# Patient Record
Sex: FEMALE | Race: WHITE | NOT HISPANIC OR LATINO
[De-identification: names, ages, dates, MRNs, and addresses within clinical notes are randomized per-mention and may not be internally consistent; named-entity substitution may affect disease eponyms.]

---

## 2021-01-07 ENCOUNTER — APPOINTMENT (OUTPATIENT)
Dept: ORTHOPEDIC SURGERY | Facility: CLINIC | Age: 61
End: 2021-01-07
Payer: OTHER MISCELLANEOUS

## 2021-01-07 PROBLEM — Z00.00 ENCOUNTER FOR PREVENTIVE HEALTH EXAMINATION: Status: ACTIVE | Noted: 2021-01-07

## 2021-01-07 PROCEDURE — 73562 X-RAY EXAM OF KNEE 3: CPT | Mod: RT

## 2021-01-07 PROCEDURE — 72100 X-RAY EXAM L-S SPINE 2/3 VWS: CPT

## 2021-01-07 PROCEDURE — 99072 ADDL SUPL MATRL&STAF TM PHE: CPT

## 2021-01-07 PROCEDURE — 99204 OFFICE O/P NEW MOD 45 MIN: CPT

## 2021-01-07 PROCEDURE — 73521 X-RAY EXAM HIPS BI 2 VIEWS: CPT

## 2021-01-07 NOTE — PHYSICAL EXAM
[de-identified] : Patient has full passive internal/external rotation of the right hip with no restrictions or pain. She does have some tenderness to palpation of the right-sided lower lumbar segments and the paraspinal musculature. Negative straight leg raising test she is neurovascular intact in right lower extremity with no significant atrophy.\par \par Specific to the knee the exam today shows there is definite warmth about the knee as compared to contralateral side there is definite medial joint line tenderness positive Maddy sign and no obvious effusion noted. Range of motion is 3-90°. The knee is stable to varus valgus stress and AP stress both at 90° of flexion and full extension. [de-identified] : Radiographs AP standing individual lateral and sunrise views are pink and well preserved joint spaces of the tibiofemoral articulation minimal narrowing of the lateral patella facet on the sunrise view of the right knee. AP and lateral of the lumbar spine were obtained showing mild degenerative changes of the facet joints intervertebral flex fairly well maintained no evidence of fracture. AP and lateral of both hips were obtained showing mild degenerative changes of the right hip.

## 2021-01-07 NOTE — HISTORY OF PRESENT ILLNESS
[de-identified] : First visit for this patient she is here because of a complaint of mostly right knee pain however she is also having pain that radiates from her lower back and right hip and down the lateral aspect of her leg. She states however she is limping most because of discomfort in the right knee. She injured her self in November at work where she stepped on a piece of last with a nail in it. She has been limping ever since. Is not improving.

## 2021-01-07 NOTE — DISCUSSION/SUMMARY
[de-identified] : Patient's clinical exam and history are consistent with a probable medial meniscal tear. She also had some overlapping symptoms are consistent with right-sided lumbar radiculopathy. Because of the degree of discomfort and as well as the duration of the discomfort of the right knee in addition to the fact that there are no root after findings it is prudent at this point to get an MRI to evaluate the size and location of a probable medial meniscal tear. It also placed on a Medrol Dosepak to help reduce her sciatic-type symptoms and also reduce some of her knee pain. We'll place her in a Genutrain knee brace for support and sent her for an MRI. She'll return with the MRI is available for review.

## 2021-01-07 NOTE — REASON FOR VISIT
[Initial Visit] : an initial visit for [FreeTextEntry2] : PATIENT STEPPED ON SOMETHING AT WORK AND THREW OFF BALANCE ON NOVEMBER 27, 2020- C/O LOW BACK-RIGHT HIP-RIGHT KNEE PAIN RADIATING DOWN INTO THE RIGHT FOOT. SENT FOR XRAYS OF LUMBAR, HIP AND KNEES

## 2021-01-10 ENCOUNTER — FORM ENCOUNTER (OUTPATIENT)
Age: 61
End: 2021-01-10

## 2021-01-12 ENCOUNTER — FORM ENCOUNTER (OUTPATIENT)
Age: 61
End: 2021-01-12

## 2021-02-09 ENCOUNTER — APPOINTMENT (OUTPATIENT)
Dept: ORTHOPEDIC SURGERY | Facility: CLINIC | Age: 61
End: 2021-02-09
Payer: OTHER MISCELLANEOUS

## 2021-02-09 PROCEDURE — 99214 OFFICE O/P EST MOD 30 MIN: CPT | Mod: 25

## 2021-02-09 PROCEDURE — 99072 ADDL SUPL MATRL&STAF TM PHE: CPT

## 2021-02-09 PROCEDURE — 20611 DRAIN/INJ JOINT/BURSA W/US: CPT | Mod: RT

## 2021-02-09 NOTE — DISCUSSION/SUMMARY
[Surgical risks reviewed] : Surgical risks reviewed [de-identified] : Patient I discussed her options she will consider knee arthroscopic surgery debridement risks and benefits were discussed in detail. Patient asked appropriate questions as did her family member. His questions were asked to their satisfaction. Talked about potential complications of the proposed surgery such as infection and non-improvement of pain.\par \par Findings the patient is a high clinical suspicion of the meniscus being the significant course of her medial sided knee discomfort. She does have some underlying early degenerative changes and I indicated that she may continue to be somewhat symptomatic treatment is underlying arthritis. Patient understands and would like to proceed nonetheless

## 2021-02-09 NOTE — PHYSICAL EXAM
[de-identified] : \par \par Specific to the knee the exam today shows there is definite warmth about the knee as compared to contralateral side there is definite medial joint line tenderness positive Maddy sign and no obvious effusion noted. Range of motion is 3-90°. The knee is stable to varus valgus stress and AP stress both at 90° of flexion and full extension.

## 2021-02-09 NOTE — PROCEDURE
[de-identified] : Patient was given a cortisone injection to the lateral compartment right knee. This is done in order to buy some temporary relief until patient considers arthroscopic surgery. Patient tolerated procedure well.

## 2021-02-09 NOTE — HISTORY OF PRESENT ILLNESS
[de-identified] : Patient returns today she returns with the results of the right knee MRI findings which show some mild degenerative changes of the medial compartment but a large complex tear of the medial meniscus as well. Recall patient continues to complain of a sharp intermittent right medial knee pain. It has not improved with conservative measures such as her Medrol Dosepak.

## 2021-03-10 DIAGNOSIS — Z01.818 ENCOUNTER FOR OTHER PREPROCEDURAL EXAMINATION: ICD-10-CM

## 2021-03-11 ENCOUNTER — APPOINTMENT (OUTPATIENT)
Dept: DISASTER EMERGENCY | Facility: CLINIC | Age: 61
End: 2021-03-11

## 2021-03-12 LAB — SARS-COV-2 N GENE NPH QL NAA+PROBE: NOT DETECTED

## 2021-03-15 ENCOUNTER — APPOINTMENT (OUTPATIENT)
Dept: ORTHOPEDIC SURGERY | Facility: AMBULATORY SURGERY CENTER | Age: 61
End: 2021-03-15
Payer: OTHER MISCELLANEOUS

## 2021-03-15 PROCEDURE — 29881 ARTHRS KNE SRG MNISECTMY M/L: CPT | Mod: RT

## 2021-03-18 ENCOUNTER — APPOINTMENT (OUTPATIENT)
Dept: ORTHOPEDIC SURGERY | Facility: CLINIC | Age: 61
End: 2021-03-18
Payer: OTHER MISCELLANEOUS

## 2021-03-18 PROCEDURE — 99024 POSTOP FOLLOW-UP VISIT: CPT

## 2021-03-18 NOTE — DISCUSSION/SUMMARY
[de-identified] : Initially sent to intensive outpatient physical therapy to improve her right quadriceps tone and range of motion. She was also instructed on exercises she could do on her own at home she will follow up in 3 weeks' time for repeat assessment

## 2021-03-18 NOTE — PHYSICAL EXAM
[de-identified] : Wound sites are well-healed clean fresh Steri-Strips were reapplied. Patient is a moderate amount of quadriceps atrophy and weakness of the right.

## 2021-03-18 NOTE — HISTORY OF PRESENT ILLNESS
[de-identified] : Patient is here status postop day 3 right knee arthroscopy. 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 Patient states she feels she is doing well she is bearing weight he could begin physical therapy.

## 2021-03-21 ENCOUNTER — FORM ENCOUNTER (OUTPATIENT)
Age: 61
End: 2021-03-21

## 2021-03-23 ENCOUNTER — FORM ENCOUNTER (OUTPATIENT)
Age: 61
End: 2021-03-23

## 2021-04-01 ENCOUNTER — APPOINTMENT (OUTPATIENT)
Dept: ORTHOPEDIC SURGERY | Facility: CLINIC | Age: 61
End: 2021-04-01
Payer: OTHER MISCELLANEOUS

## 2021-04-01 PROCEDURE — 99024 POSTOP FOLLOW-UP VISIT: CPT

## 2021-04-01 NOTE — PHYSICAL EXAM
[de-identified] : Right knee arthroscopic portal sites well-healed patient does have significant quad atrophy of the right as compared to the left. Range of motion 0-120° with mild crepitus warmth about the knee soft tissue swelling but no obvious effusion.

## 2021-04-01 NOTE — DISCUSSION/SUMMARY
[de-identified] : Patient continues to have significant quadriceps atrophy of the right which limits her ability to stair climbing and prolonged walking. We have emphasized to her that she should again formal physical therapy now that has been authorized 3 times a week for 4 weeks follow up with me in 4 weeks' time at that point she'll be reassessed for return to work

## 2021-04-01 NOTE — HISTORY OF PRESENT ILLNESS
[de-identified] : This is a postop visit status post arthroscopy right knee. Patient still having some difficulty going up and down steps but states she feels moderately improved with diminished medial knee pain. She still does have pain however. She has not been to physical therapy but she produced paperwork indicating that physical therapy has been improved by Worker's Compensation.

## 2021-05-13 ENCOUNTER — APPOINTMENT (OUTPATIENT)
Dept: ORTHOPEDIC SURGERY | Facility: CLINIC | Age: 61
End: 2021-05-13
Payer: OTHER MISCELLANEOUS

## 2021-05-13 PROCEDURE — 99024 POSTOP FOLLOW-UP VISIT: CPT

## 2021-05-13 PROCEDURE — 20611 DRAIN/INJ JOINT/BURSA W/US: CPT | Mod: 58,RT

## 2021-05-13 NOTE — PHYSICAL EXAM
[de-identified] : Right knee arthroscopic portal sites well-healed patient does have significant quad atrophy of the right as compared to the left. Range of motion 0-120° with mild crepitus warmth about the knee soft tissue swelling but no obvious effusion.

## 2021-05-13 NOTE — PROCEDURE
[de-identified] : Patient was given a cortisone injection today. This done in sterile conditions and ultrasound guidance to the lateral compartment of the right knee. Patient tolerated procedure well.

## 2021-05-13 NOTE — DISCUSSION/SUMMARY
[Medication Risks Reviewed] : Medication risks reviewed [de-identified] : Patient will be placed on Celebrex 200 mg p.o. b.i.d. for 6 day course than to be taken thereafter as needed she also is told to ice the knee twice a day and continue physical therapy she'll followup with us next week if she is not able to return to work on the 17th.

## 2021-05-13 NOTE — HISTORY OF PRESENT ILLNESS
[de-identified] : Patient returns today she got 2 months status post right knee arthroscopy. Her most recent postop visit she was noted to have significant right quadriceps atrophy and weakness compared to contralateral side. Patient still complains of pain especially going up and down steps and swelling and he in the knee. She states she is somewhat improved continues to go to physical therapy is scheduled to return to full work next week but is not sure she can do it.

## 2021-05-18 ENCOUNTER — APPOINTMENT (OUTPATIENT)
Dept: ORTHOPEDIC SURGERY | Facility: CLINIC | Age: 61
End: 2021-05-18
Payer: OTHER MISCELLANEOUS

## 2021-05-18 PROCEDURE — 73562 X-RAY EXAM OF KNEE 3: CPT | Mod: RT

## 2021-05-18 PROCEDURE — 99024 POSTOP FOLLOW-UP VISIT: CPT

## 2021-05-18 NOTE — PHYSICAL EXAM
[de-identified] : The patient's exam today she continues to have significant quadriceps atrophy of the right as compared to the left right knee is warm tender to touch at the medial compartment. Range of motion 0-95°. No obvious effusion soft tissue swelling is noted. [de-identified] : Radiographs were repeated today AP standing individual lateral and sunrise views. Special attention was paid to the medial compartment with standing AP projection of the right knee and compared to radiographs from January and seems to be approximately 50% diminishment of the cartilage height in the interval time frame.

## 2021-05-18 NOTE — DISCUSSION/SUMMARY
[de-identified] : Patient is not improving she may have a SONK lesion. She'll be sent for an MRI to evaluate the source of her medial knee discomfort of the right knee.

## 2021-05-18 NOTE — HISTORY OF PRESENT ILLNESS
[de-identified] : Patient returns today she has not improved with either the Medrol Dosepak with a cortisone injection previously given. In fact he seems to worsen in terms of pain on the medial aspect of the right knee. Patient also is ambulating with a cane because of discomfort and limp.

## 2021-05-19 ENCOUNTER — FORM ENCOUNTER (OUTPATIENT)
Age: 61
End: 2021-05-19

## 2021-05-20 ENCOUNTER — FORM ENCOUNTER (OUTPATIENT)
Age: 61
End: 2021-05-20

## 2021-06-22 ENCOUNTER — APPOINTMENT (OUTPATIENT)
Dept: ORTHOPEDIC SURGERY | Facility: CLINIC | Age: 61
End: 2021-06-22
Payer: OTHER MISCELLANEOUS

## 2021-06-22 PROCEDURE — 99214 OFFICE O/P EST MOD 30 MIN: CPT | Mod: 25

## 2021-06-22 PROCEDURE — 20611 DRAIN/INJ JOINT/BURSA W/US: CPT | Mod: RT

## 2021-06-22 PROCEDURE — 99072 ADDL SUPL MATRL&STAF TM PHE: CPT

## 2021-06-22 NOTE — REASON FOR VISIT
[Follow-Up Visit] : a follow-up visit for [FreeTextEntry2] : knee scope 3/15 -- still having pain sent for new MRI - results scanned in chart

## 2021-06-22 NOTE — HISTORY OF PRESENT ILLNESS
[de-identified] : Patient is now 3 months status post right knee arthroscopy. She continues to complain about pain she has a hinged brace on her knee and she also ambulates with a cane and recall this is a Worker's Comp. injury. During the arthroscopic seizure she is noted to have some mild degenerative changes of the medial compartment and also medial meniscal tear which was removed and we had the medial meniscus internal diameter restored to smooth construct. Patient did have a repeat MRI recently and it essentially shows the removal of the torn aspect of the meniscus some possible slight re re tearing of the meniscus and also degenerative changes of medial compartment.\par \par Patient has been on physical therapy however she states she doesn't do much exercise as stated purpose of the PT but specifically improve her significant atrophy of the right quadrants. The patient has not been doing any significant exercise by her report.

## 2021-06-22 NOTE — PROCEDURE
[de-identified] : Patient was given a cortisone injection today. This demonstrates an ultrasound guidance the lateral compartment of the right knee. Patient tolerated the procedure well.

## 2021-06-22 NOTE — DISCUSSION/SUMMARY
[de-identified] : Patient has not really improved since the last visit her right quadriceps seem to be considerably atrophied and not improved from the last visit. It sounds wishes not really engaging significant physical therapy to improve her quadriceps strength at physical therapy. She was given a cortisone injection to help reduce some of the pain it may be trivial to her moderate degenerative change of the medial compartment we also recommended use of Orthovisc injections to further help reduce the discomfort caused by the medial compartment arthritis. Patient will be sent begin physical therapy with an emphasis on quadriceps strengthening.

## 2021-06-29 ENCOUNTER — FORM ENCOUNTER (OUTPATIENT)
Age: 61
End: 2021-06-29

## 2021-07-15 ENCOUNTER — APPOINTMENT (OUTPATIENT)
Dept: ORTHOPEDIC SURGERY | Facility: CLINIC | Age: 61
End: 2021-07-15
Payer: OTHER MISCELLANEOUS

## 2021-07-15 PROCEDURE — 99072 ADDL SUPL MATRL&STAF TM PHE: CPT

## 2021-07-15 PROCEDURE — 99214 OFFICE O/P EST MOD 30 MIN: CPT | Mod: 25

## 2021-07-15 PROCEDURE — 20611 DRAIN/INJ JOINT/BURSA W/US: CPT | Mod: RT

## 2021-07-15 NOTE — HISTORY OF PRESENT ILLNESS
[de-identified] : Patient is complaining of right knee medial discomfort she states she also feels that the leg is weak and gives way occasionally. She does wear a neoprene support brace. She is here for first in a series of 3 right knee Orthovisc injections.

## 2021-07-15 NOTE — PROCEDURE
[de-identified] : Patient was given the first of a series of 3 Orthovisc injections today. This is done under sterile conditions an ultrasound guidance. Patient tolerated procedure well.

## 2021-07-15 NOTE — PHYSICAL EXAM
[de-identified] : Right knee remains slightly warm there is soft tissue swelling but no effusion range of motion is 0-130°. The knee is stable to AP stress varus valgus stress there is some mild crepitus with range of motion. There is significant quadriceps atrophy of the right as compared to the left. Definite mild to moderate medial joint line tenderness to palpation.

## 2021-07-15 NOTE — DISCUSSION/SUMMARY
[de-identified] : A long discussion with the patient today. I believe that she needs to do significant strengthening of the quadriceps that in itself also help reduce her discomfort. We will wait until after the third injection to give her formal PT prescription. Also mentioned the possibility of knee replacement surgery if the medially based arthritis did not respond well to these injections.

## 2021-07-22 ENCOUNTER — APPOINTMENT (OUTPATIENT)
Dept: ORTHOPEDIC SURGERY | Facility: CLINIC | Age: 61
End: 2021-07-22
Payer: OTHER MISCELLANEOUS

## 2021-07-22 PROCEDURE — 99072 ADDL SUPL MATRL&STAF TM PHE: CPT

## 2021-07-22 PROCEDURE — 99214 OFFICE O/P EST MOD 30 MIN: CPT | Mod: 25

## 2021-07-22 PROCEDURE — 20611 DRAIN/INJ JOINT/BURSA W/US: CPT | Mod: RT

## 2021-07-22 NOTE — PHYSICAL EXAM
[de-identified] : Right knee remains slightly warm there is soft tissue swelling but no effusion range of motion is 0-130°. The knee is stable to AP stress varus valgus stress there is some mild crepitus with range of motion. There is significant quadriceps atrophy of the right as compared to the left. Definite mild to moderate medial joint line tenderness to palpation.

## 2021-07-22 NOTE — DISCUSSION/SUMMARY
[Medication Risks Reviewed] : Medication risks reviewed [de-identified] : Patient I discussed her condition at length today. We will place her also on Celebrex 200 mg p.o. b.i.d. for 6 day course then to be taken thereafter as needed she'll follow up with me in 2 weeks for her final injection due to my absence next week.

## 2021-07-22 NOTE — HISTORY OF PRESENT ILLNESS
[de-identified] : Patient presents for her second Orthovisc injection to the right knee. She continues to use a brace and a cane.

## 2021-07-22 NOTE — PROCEDURE
[de-identified] : The patient was given the second of 3 right knee Orthovisc injections today. This is done under sterile conditions an ultrasound guidance. Patient tolerated the procedure well.

## 2021-08-05 ENCOUNTER — APPOINTMENT (OUTPATIENT)
Dept: ORTHOPEDIC SURGERY | Facility: CLINIC | Age: 61
End: 2021-08-05
Payer: OTHER MISCELLANEOUS

## 2021-08-05 PROCEDURE — 99072 ADDL SUPL MATRL&STAF TM PHE: CPT

## 2021-08-05 PROCEDURE — 20611 DRAIN/INJ JOINT/BURSA W/US: CPT | Mod: RT

## 2021-08-05 PROCEDURE — 99214 OFFICE O/P EST MOD 30 MIN: CPT | Mod: 25

## 2021-08-05 NOTE — HISTORY OF PRESENT ILLNESS
[de-identified] : Patient is felt minimal improvement thus far she is here for the third and final right knee Orthovisc injection.

## 2021-08-05 NOTE — PROCEDURE
[de-identified] : The patient was given the third of 3 right knee Orthovisc injections today. This is done under sterile conditions an ultrasound guidance. Patient tolerated the procedure well.

## 2021-08-05 NOTE — DISCUSSION/SUMMARY
[de-identified] : Patient will observe her symptoms follow up in 3 weeks if not improved. Her spent some time today talking about possible need for knee replacement surgery if symptoms do not improve.

## 2021-08-05 NOTE — PHYSICAL EXAM
[de-identified] : Right knee remains slightly warm there is soft tissue swelling but no effusion range of motion is 0-130°. The knee is stable to AP stress varus valgus stress there is some mild crepitus with range of motion. There is significant quadriceps atrophy of the right as compared to the left. Definite mild to moderate medial joint line tenderness to palpation.

## 2021-08-12 ENCOUNTER — FORM ENCOUNTER (OUTPATIENT)
Age: 61
End: 2021-08-12

## 2021-08-15 ENCOUNTER — FORM ENCOUNTER (OUTPATIENT)
Age: 61
End: 2021-08-15

## 2021-08-24 ENCOUNTER — APPOINTMENT (OUTPATIENT)
Dept: ORTHOPEDIC SURGERY | Facility: CLINIC | Age: 61
End: 2021-08-24
Payer: OTHER MISCELLANEOUS

## 2021-08-24 PROCEDURE — 99072 ADDL SUPL MATRL&STAF TM PHE: CPT

## 2021-08-24 PROCEDURE — 99214 OFFICE O/P EST MOD 30 MIN: CPT

## 2021-08-24 NOTE — DISCUSSION/SUMMARY
[Surgical risks reviewed] : Surgical risks reviewed [de-identified] : After reviewing patient's recent serial radiographs and MRI postoperatively I believe the best course of action due to the patient's continuing level of discomfort that is refractory to conservative care would be to consider knee replacement surgery. The reasonable risks and benefits were discussed in detail including potential complications of surgery we talked about typical convalescent expectations including potential return to work.We talked at length including using her family member as an  when needed to discuss potential palpitations and may require revision surgery such as component loosening migration wear infection stiffness and instability.

## 2021-08-24 NOTE — PHYSICAL EXAM
[de-identified] : Right knee has some mild quadriceps atrophy but improved from last visit. Her range of motion is 5 -120° the knee is stable to AP stress varus nondistressed definite warmth and tenderness to palpation of the medial joint line. No effusion is noted.

## 2021-08-24 NOTE — HISTORY OF PRESENT ILLNESS
[de-identified] : Patient returns today she is now status post approximate 5 months right knee arthroscopy for a complex medial meniscal tear. Recall during surgery the meniscus was adequately removed and contoured however patient does note that the moderate to advanced arthritis of the weightbearing surface of the medial femoral condyle. Patient is not significantly improved continues to have difficulty ambulating has continued medial right knee pain which seems to be worsening. She has not been able to return to work. Patient has had 3 cortisone injections with no improvement. She is here to discuss further options.

## 2021-09-07 ENCOUNTER — FORM ENCOUNTER (OUTPATIENT)
Age: 61
End: 2021-09-07

## 2021-10-05 ENCOUNTER — FORM ENCOUNTER (OUTPATIENT)
Age: 61
End: 2021-10-05

## 2021-10-07 ENCOUNTER — APPOINTMENT (OUTPATIENT)
Dept: ORTHOPEDIC SURGERY | Facility: CLINIC | Age: 61
End: 2021-10-07
Payer: OTHER MISCELLANEOUS

## 2021-10-07 PROCEDURE — 99072 ADDL SUPL MATRL&STAF TM PHE: CPT

## 2021-10-07 PROCEDURE — 99455 WORK RELATED DISABILITY EXAM: CPT

## 2021-10-07 NOTE — HISTORY OF PRESENT ILLNESS
[de-identified] : Patient is here for a scheduled loss assessment for her right knee. She is status post work related injury he has undergone right knee arthroscopy and now has developed arthritis to the degree that H. we are contemplating knee replacement surgery.

## 2021-10-07 NOTE — DISCUSSION/SUMMARY
[de-identified] : In accordance with the Worker's Compensation guidelines for determining permanent impairment and loss of weight training capacity patient has a total of a 65% scheduled loss.\par \par She had a medial meniscus excision with attendant muscle atrophy which gives her a 15% loss patient has flexion limited to 90° which affords her a 40% scheduled loss. Patient also has a proximate 10° flexion contracture which gives her an additional 10% scheduled loss. All this culminates to a total of 65%.

## 2021-10-07 NOTE — REASON FOR VISIT
[Follow-Up Visit] : a follow-up visit for [Workers' Comp: Date of Injury: _______] : This visit is related to worker's compensation. Date of Injury: [unfilled] [FreeTextEntry2] : RIGHT KNEE S L U

## 2021-10-19 ENCOUNTER — APPOINTMENT (OUTPATIENT)
Dept: INTERNAL MEDICINE | Facility: CLINIC | Age: 61
End: 2021-10-19

## 2021-11-02 ENCOUNTER — TRANSCRIPTION ENCOUNTER (OUTPATIENT)
Age: 61
End: 2021-11-02

## 2021-11-02 RX ORDER — OXYCODONE HYDROCHLORIDE 5 MG/1
20 TABLET ORAL ONCE
Refills: 0 | Status: DISCONTINUED | OUTPATIENT
Start: 2021-11-03 | End: 2021-11-06

## 2021-11-02 RX ORDER — CHLORHEXIDINE GLUCONATE 213 G/1000ML
1 SOLUTION TOPICAL ONCE
Refills: 0 | Status: DISCONTINUED | OUTPATIENT
Start: 2021-11-03 | End: 2021-11-06

## 2021-11-02 RX ORDER — POVIDONE-IODINE 5 %
1 AEROSOL (ML) TOPICAL ONCE
Refills: 0 | Status: DISCONTINUED | OUTPATIENT
Start: 2021-11-03 | End: 2021-11-06

## 2021-11-02 RX ORDER — CELECOXIB 200 MG/1
400 CAPSULE ORAL ONCE
Refills: 0 | Status: DISCONTINUED | OUTPATIENT
Start: 2021-11-03 | End: 2021-11-06

## 2021-11-02 NOTE — H&P ADULT - NSHPPHYSICALEXAM_GEN_ALL_CORE
PE:  Decreased ROM secondary to pain. Rest of PE per medical clearance. Gen: 62 y/o, NAD  MSK: Decreased right knee ROM secondary to pain  Skin without erythema, ecchymosis, abrasions or lesions, signs of infection  Calves soft, nontender bilaterally   Sensation intact to light touch bilateral lower extremities  Pulses: DP 2+brisk capillary refill  EHL/FHL/TA/GS 5/5 bilaterally     Rest of PE per MD clearance

## 2021-11-02 NOTE — H&P ADULT - PROBLEM SELECTOR PLAN 1
Admit to Orthopaedic Service.  Presents today for elective RIGHT TKR.   Pt medically stable and cleared for procedure today by Dr. Thao.

## 2021-11-02 NOTE — H&P ADULT - HISTORY OF PRESENT ILLNESS
62yo f c/o right knee pain x   Presents today for elective RIGHT TKR.  62yo f c/o right knee pain x 1 year after an accident at work in November 2020. She reports pain within the knee joint. She has tried medications, CSI, HA injections. She currently uses a cane.     Presents today for elective RIGHT TKR.

## 2021-11-03 ENCOUNTER — APPOINTMENT (OUTPATIENT)
Dept: ORTHOPEDIC SURGERY | Facility: HOSPITAL | Age: 61
End: 2021-11-03
Payer: OTHER MISCELLANEOUS

## 2021-11-03 ENCOUNTER — INPATIENT (INPATIENT)
Facility: HOSPITAL | Age: 61
LOS: 2 days | Discharge: HOME HEALTH | DRG: 470 | End: 2021-11-06
Attending: SPECIALIST | Admitting: SPECIALIST
Payer: OTHER MISCELLANEOUS

## 2021-11-03 VITALS
OXYGEN SATURATION: 98 % | HEART RATE: 69 BPM | WEIGHT: 148.81 LBS | HEIGHT: 62 IN | TEMPERATURE: 97 F | RESPIRATION RATE: 16 BRPM | DIASTOLIC BLOOD PRESSURE: 83 MMHG | SYSTOLIC BLOOD PRESSURE: 135 MMHG

## 2021-11-03 DIAGNOSIS — Z98.890 OTHER SPECIFIED POSTPROCEDURAL STATES: Chronic | ICD-10-CM

## 2021-11-03 DIAGNOSIS — M19.90 UNSPECIFIED OSTEOARTHRITIS, UNSPECIFIED SITE: ICD-10-CM

## 2021-11-03 DIAGNOSIS — F17.200 NICOTINE DEPENDENCE, UNSPECIFIED, UNCOMPLICATED: ICD-10-CM

## 2021-11-03 PROCEDURE — 73560 X-RAY EXAM OF KNEE 1 OR 2: CPT | Mod: 26,RT

## 2021-11-03 PROCEDURE — 27447 TOTAL KNEE ARTHROPLASTY: CPT | Mod: AS,RT

## 2021-11-03 PROCEDURE — 27447 TOTAL KNEE ARTHROPLASTY: CPT | Mod: RT

## 2021-11-03 RX ORDER — ACETAMINOPHEN 500 MG
975 TABLET ORAL EVERY 8 HOURS
Refills: 0 | Status: DISCONTINUED | OUTPATIENT
Start: 2021-11-03 | End: 2021-11-06

## 2021-11-03 RX ORDER — OXYCODONE HYDROCHLORIDE 5 MG/1
5 TABLET ORAL EVERY 4 HOURS
Refills: 0 | Status: DISCONTINUED | OUTPATIENT
Start: 2021-11-03 | End: 2021-11-06

## 2021-11-03 RX ORDER — CEFAZOLIN SODIUM 1 G
2000 VIAL (EA) INJECTION EVERY 8 HOURS
Refills: 0 | Status: COMPLETED | OUTPATIENT
Start: 2021-11-03 | End: 2021-11-04

## 2021-11-03 RX ORDER — CELECOXIB 200 MG/1
200 CAPSULE ORAL EVERY 12 HOURS
Refills: 0 | Status: DISCONTINUED | OUTPATIENT
Start: 2021-11-04 | End: 2021-11-06

## 2021-11-03 RX ORDER — HYDROMORPHONE HYDROCHLORIDE 2 MG/ML
0.5 INJECTION INTRAMUSCULAR; INTRAVENOUS; SUBCUTANEOUS
Refills: 0 | Status: DISCONTINUED | OUTPATIENT
Start: 2021-11-03 | End: 2021-11-06

## 2021-11-03 RX ORDER — OXYCODONE HYDROCHLORIDE 5 MG/1
10 TABLET ORAL EVERY 4 HOURS
Refills: 0 | Status: DISCONTINUED | OUTPATIENT
Start: 2021-11-03 | End: 2021-11-06

## 2021-11-03 RX ORDER — ASPIRIN/CALCIUM CARB/MAGNESIUM 324 MG
325 TABLET ORAL DAILY
Refills: 0 | Status: DISCONTINUED | OUTPATIENT
Start: 2021-11-03 | End: 2021-11-06

## 2021-11-03 RX ORDER — HYDROMORPHONE HYDROCHLORIDE 2 MG/ML
0.5 INJECTION INTRAMUSCULAR; INTRAVENOUS; SUBCUTANEOUS EVERY 4 HOURS
Refills: 0 | Status: DISCONTINUED | OUTPATIENT
Start: 2021-11-03 | End: 2021-11-06

## 2021-11-03 RX ORDER — SODIUM CHLORIDE 9 MG/ML
1000 INJECTION, SOLUTION INTRAVENOUS
Refills: 0 | Status: DISCONTINUED | OUTPATIENT
Start: 2021-11-04 | End: 2021-11-06

## 2021-11-03 RX ORDER — INFLUENZA VIRUS VACCINE 15; 15; 15; 15 UG/.5ML; UG/.5ML; UG/.5ML; UG/.5ML
0.5 SUSPENSION INTRAMUSCULAR ONCE
Refills: 0 | Status: DISCONTINUED | OUTPATIENT
Start: 2021-11-03 | End: 2021-11-06

## 2021-11-03 RX ADMIN — Medication 975 MILLIGRAM(S): at 22:24

## 2021-11-03 RX ADMIN — Medication 100 MILLIGRAM(S): at 21:23

## 2021-11-03 RX ADMIN — Medication 975 MILLIGRAM(S): at 21:24

## 2021-11-03 NOTE — PACU DISCHARGE NOTE - COMMENTS
Pt has met criteria for discharge to floor.  Report called to ASA Johnson on 8 [address].  IV remains patent.  Dsg. clean, dry, intact with cryocuff in place. VSS.  Pt denies pain at present.  Pt transported to floor via bed by RN and PCA.

## 2021-11-03 NOTE — PROGRESS NOTE ADULT - SUBJECTIVE AND OBJECTIVE BOX
Ortho Post Op Check    Procedure: R TKA  Surgeon: Kleber    Pt comfortable without complaints, pain controlled  Denies CP, SOB, N/V, numbness/tingling     Vital Signs Last 24 Hrs  T(C): 36.6 (11-03-21 @ 15:25), Max: 36.6 (11-03-21 @ 15:25)  T(F): 97.8 (11-03-21 @ 15:25), Max: 97.8 (11-03-21 @ 15:25)  HR: 66 (11-03-21 @ 16:50) (66 - 78)  BP: 144/75 (11-03-21 @ 16:10) (139/70 - 146/76)  BP(mean): 103 (11-03-21 @ 16:10) (97 - 105)  RR: 19 (11-03-21 @ 16:50) (18 - 20)  SpO2: 100% (11-03-21 @ 16:50) (97% - 100%)  I&O's Summary    03 Nov 2021 07:01  -  03 Nov 2021 17:17  --------------------------------------------------------  IN: 300 mL / OUT: 0 mL / NET: 300 mL        Physical Exam:  General: Pt Alert and oriented, NAD  DSG C/D/I  Pulses: 2+ radial or dp, pt pulses, wwp, cap refill <3 sec  Sensation: SILT in sural/saph/sp/dp/tibial or axillary/radial/median/ulnar distributions  Motor: EHL/FHL/TA/GS or axillary/AIN/PIN/ulnar firing              Post-op X-Ray: hardware in place maintaining correct alignment    A/P: 61yFemale bosnian speaking POD#0 s/p R TKA  - Stable  - Pain Control  - f/u AM labs  - DVT ppx:  daily  - Post op abx: periop ancef  - PT, WBS: WBAT  - Dispo: pending PT cyndi Reynoso, PGY-1  Ortho Pager [phone] Ortho Post Op Check    Procedure: R TKA  Surgeon: Kleber    Pt comfortable without complaints, pain controlled  Denies CP, SOB, N/V, numbness/tingling     Vital Signs Last 24 Hrs  T(C): 36.6 (11-03-21 @ 15:25), Max: 36.6 (11-03-21 @ 15:25)  T(F): 97.8 (11-03-21 @ 15:25), Max: 97.8 (11-03-21 @ 15:25)  HR: 66 (11-03-21 @ 16:50) (66 - 78)  BP: 144/75 (11-03-21 @ 16:10) (139/70 - 146/76)  BP(mean): 103 (11-03-21 @ 16:10) (97 - 105)  RR: 19 (11-03-21 @ 16:50) (18 - 20)  SpO2: 100% (11-03-21 @ 16:50) (97% - 100%)  I&O's Summary    03 Nov 2021 07:01  -  03 Nov 2021 17:17  --------------------------------------------------------  IN: 300 mL / OUT: 0 mL / NET: 300 mL        Physical Exam:  General: Pt Alert and oriented, NAD  DSG aquacell C/D/I cryocuff R knee  Pulses: 2+ dp, pt pulses, toes wwp, cap refill <3 sec  Sensation: Decreased sensation throughout RLE  Motor: EHL/FHL/TA/GS firing equally b/l LE              Post-op X-Ray: hardware in place maintaining correct alignment    A/P: 61yFemale bosnian speaking POD#0 s/p R TKA with Kleber on 11/3.  - Stable  - Pain Control  - f/u AM labs  - DVT ppx:  daily  - Post op abx: periop ancef  - PT, WBS: WBAT  - Dispo: pending PT cyndi Reynoso, PGY-1  Ortho Pager [phone]

## 2021-11-04 ENCOUNTER — TRANSCRIPTION ENCOUNTER (OUTPATIENT)
Age: 61
End: 2021-11-04

## 2021-11-04 LAB
ANION GAP SERPL CALC-SCNC: 12 MMOL/L — SIGNIFICANT CHANGE UP (ref 5–17)
BUN SERPL-MCNC: 18 MG/DL — SIGNIFICANT CHANGE UP (ref 7–23)
CALCIUM SERPL-MCNC: 8.8 MG/DL — SIGNIFICANT CHANGE UP (ref 8.4–10.5)
CHLORIDE SERPL-SCNC: 110 MMOL/L — HIGH (ref 96–108)
CO2 SERPL-SCNC: 22 MMOL/L — SIGNIFICANT CHANGE UP (ref 22–31)
COVID-19 NUCLEOCAPSID GAM AB INTERP: NEGATIVE — SIGNIFICANT CHANGE UP
COVID-19 NUCLEOCAPSID TOTAL GAM ANTIBODY RESULT: 0.09 INDEX — SIGNIFICANT CHANGE UP
COVID-19 SPIKE DOMAIN AB INTERP: POSITIVE
COVID-19 SPIKE DOMAIN ANTIBODY RESULT: >250 U/ML — HIGH
CREAT SERPL-MCNC: 0.84 MG/DL — SIGNIFICANT CHANGE UP (ref 0.5–1.3)
GLUCOSE SERPL-MCNC: 103 MG/DL — HIGH (ref 70–99)
HCT VFR BLD CALC: 31 % — LOW (ref 34.5–45)
HCV AB S/CO SERPL IA: 0.04 S/CO — SIGNIFICANT CHANGE UP
HCV AB SERPL-IMP: SIGNIFICANT CHANGE UP
HGB BLD-MCNC: 9.9 G/DL — LOW (ref 11.5–15.5)
MCHC RBC-ENTMCNC: 26.8 PG — LOW (ref 27–34)
MCHC RBC-ENTMCNC: 31.9 GM/DL — LOW (ref 32–36)
MCV RBC AUTO: 83.8 FL — SIGNIFICANT CHANGE UP (ref 80–100)
NRBC # BLD: 0 /100 WBCS — SIGNIFICANT CHANGE UP (ref 0–0)
PLATELET # BLD AUTO: 305 K/UL — SIGNIFICANT CHANGE UP (ref 150–400)
POTASSIUM SERPL-MCNC: 4.4 MMOL/L — SIGNIFICANT CHANGE UP (ref 3.5–5.3)
POTASSIUM SERPL-SCNC: 4.4 MMOL/L — SIGNIFICANT CHANGE UP (ref 3.5–5.3)
RBC # BLD: 3.7 M/UL — LOW (ref 3.8–5.2)
RBC # FLD: 14.1 % — SIGNIFICANT CHANGE UP (ref 10.3–14.5)
SARS-COV-2 IGG+IGM SERPL QL IA: 0.09 INDEX — SIGNIFICANT CHANGE UP
SARS-COV-2 IGG+IGM SERPL QL IA: >250 U/ML — HIGH
SARS-COV-2 IGG+IGM SERPL QL IA: NEGATIVE — SIGNIFICANT CHANGE UP
SARS-COV-2 IGG+IGM SERPL QL IA: POSITIVE
SODIUM SERPL-SCNC: 144 MMOL/L — SIGNIFICANT CHANGE UP (ref 135–145)
WBC # BLD: 13.84 K/UL — HIGH (ref 3.8–10.5)
WBC # FLD AUTO: 13.84 K/UL — HIGH (ref 3.8–10.5)

## 2021-11-04 RX ADMIN — Medication 100 MILLIGRAM(S): at 05:40

## 2021-11-04 RX ADMIN — Medication 975 MILLIGRAM(S): at 06:40

## 2021-11-04 RX ADMIN — Medication 325 MILLIGRAM(S): at 11:24

## 2021-11-04 RX ADMIN — CELECOXIB 200 MILLIGRAM(S): 200 CAPSULE ORAL at 17:43

## 2021-11-04 RX ADMIN — Medication 975 MILLIGRAM(S): at 05:40

## 2021-11-04 RX ADMIN — CELECOXIB 200 MILLIGRAM(S): 200 CAPSULE ORAL at 18:43

## 2021-11-04 RX ADMIN — Medication 975 MILLIGRAM(S): at 21:36

## 2021-11-04 RX ADMIN — Medication 975 MILLIGRAM(S): at 14:04

## 2021-11-04 RX ADMIN — Medication 975 MILLIGRAM(S): at 22:56

## 2021-11-04 RX ADMIN — Medication 975 MILLIGRAM(S): at 13:04

## 2021-11-04 NOTE — DISCHARGE NOTE PROVIDER - NSDCFUADDINST_GEN_ALL_CORE_FT
Weight bear as tolerated with assistive device.  No strenuous activity, heavy lifting, driving or returning to work until cleared by MD.  You may shower - dressing is water-resistant, no soaking in bathtubs.  Remove dressing after post op day 5-7, then leave incision open to air. Keep incision clean and dry.  Try to have regular bowel movements, take stool softener or laxative if necessary.  May take Pepcid or Prilosec for upset stomach.  May take Aleve or Naproxen if needed.  Do not apply any creams or ointments on the incision or around the incision/dressing.  Swelling may travel all the way down leg to foot, this is normal and will subside in a few weeks.  Call to schedule an appt with Dr. Shahid for follow up, if you have staples or sutures they will be removed in office.  Contact your doctor if you experience: fever greater than 101.5, chills, chest pain, difficulty breathing, redness or excessive drainage around the incision, other concerns.  Follow up with your primary care provider.  Weight bear as tolerated with assistive device.  No strenuous activity, heavy lifting, driving or returning to work until cleared by MD.  You may shower - dressing is water-resistant, no soaking in bathtubs.  Remove dressing after post op day 5-7, then leave incision open to air. Keep incision clean and dry.  Try to have regular bowel movements, take stool softener or laxative if necessary.  May take Pepcid or Prilosec for upset stomach.  Do not apply any creams or ointments on the incision or around the incision/dressing.  Swelling may travel all the way down leg to foot, this is normal and will subside in a few weeks.  Call to schedule an appt with Dr. Shahid for follow up, if you have staples or sutures they will be removed in office.  Contact your doctor if you experience: fever greater than 101.5, chills, chest pain, difficulty breathing, redness or excessive drainage around the incision, other concerns.  Follow up with your primary care provider.

## 2021-11-04 NOTE — PHYSICAL THERAPY INITIAL EVALUATION ADULT - ACTIVE RANGE OF MOTION EXAMINATION, REHAB EVAL
except right knee flexion 5-25/no Active ROM deficits were identified except right knee flexion 5-25 degrees/no Active ROM deficits were identified

## 2021-11-04 NOTE — DISCHARGE NOTE PROVIDER - NSDCMRMEDTOKEN_GEN_ALL_CORE_FT
celecoxib 200 mg oral capsule: 1 cap(s) orally once a day  methylPREDNISolone 4 mg oral tablet:   Orthovisc 30 mg/2 mL intra-articular solution:    acetaminophen 325 mg oral tablet: 2 tab(s) orally every 8 hours as needed for mild pain  Aspirin Enteric Coated 325 mg oral delayed release tablet: 1 tab(s) orally once a day   celecoxib 200 mg oral capsule: 1 cap(s) orally once a day  oxyCODONE 5 mg oral tablet: 1-2 tab(s) orally every 4 hours, As Needed for moderate to severe pain MDD:6

## 2021-11-04 NOTE — PROGRESS NOTE ADULT - SUBJECTIVE AND OBJECTIVE BOX
Orthopaedic Surgery Progress Note    Post-operative day #1 s/p right total knee replacement     Subjective:     Patient seen and examined. Patient comfortable without complaints, pain controlled. States she has some numbness to medial aspect of right lower extremity.     Objective:    Vital Signs Last 24 Hrs  T(C): 36.8 (11-04-21 @ 09:00), Max: 36.8 (11-04-21 @ 09:00)  T(F): 98.2 (11-04-21 @ 09:00), Max: 98.2 (11-04-21 @ 09:00)  HR: 79 (11-04-21 @ 09:50) (73 - 79)  BP: 131/76 (11-04-21 @ 09:50) (106/65 - 131/76)  BP(mean): 79 (11-04-21 @ 09:00) (79 - 79)  RR: 18 (11-04-21 @ 09:00) (18 - 18)  SpO2: 96% (11-04-21 @ 09:00) (96% - 96%)  AVSS    PE:  General: Patient alert and oriented, NAD  Dressing: Clean/dry/intact right knee cryocuff   Skin well perfused   Sensation: has diminished sensation right lower extremity medial aspect of right thigh down to right ankle compared to left lower extremity   Motor: EHL/FHL/TA/GS firing bilateral lower extremities                           9.9    13.84 )-----------( 305      ( 04 Nov 2021 07:00 )             31.0   11-04    144  |  110<H>  |  18  ----------------------------<  103<H>  4.4   |  22  |  0.84    Ca    8.8      04 Nov 2021 07:00        A/P: 61yFemale POD#1 s/p right total knee replacement   1. Pain control as needed  2. DVT prophylaxis: ASA  3. PT, weight-bearing status: wbat   4. Dispo: pending PT clearance  5. Discussed neuromotor exam with Dr. Shahid, will continue to monitor sensation to right lower extremity     Ortho Pager [phone]

## 2021-11-04 NOTE — DISCHARGE NOTE PROVIDER - CARE PROVIDER_API CALL
Mil Shahid)  Orthopaedic Surgery  [address] [facility], 10th [place] [address]  Phone: [phone]  Fax: [phone]  Follow Up Time:    Mil Shahid)  Orthopaedic Surgery  [address] [facility], 10th [place] [address]  Phone: [phone]  Fax: [phone]  Follow Up Time: 2 weeks

## 2021-11-04 NOTE — DISCHARGE NOTE PROVIDER - CARE PROVIDERS DIRECT ADDRESSES
[email or web address]

## 2021-11-04 NOTE — PHYSICAL THERAPY INITIAL EVALUATION ADULT - ADDITIONAL COMMENTS
Patient lives with family in an apartment on the first floor with 5 [address]. Ambulated independently PTA with cane.

## 2021-11-04 NOTE — PHYSICAL THERAPY INITIAL EVALUATION ADULT - ASR WT BEARING STATUS EVAL
PRN Medication Documentation    Specific patient behavior that led to need for PRN medication: pt requested sleeping medication  Staff interventions attempted prior to PRN being given: foods and fluids, reassurance, practicing sleep hygeiene  PRN medication given: ambien 10 mg po prn at 2136  Patient response/effectiveness of PRN medication: 30 minutes post administration. Pt lay quietly in bed NAD. no weight-bearing restrictions

## 2021-11-04 NOTE — PHYSICAL THERAPY INITIAL EVALUATION ADULT - GENERAL OBSERVATIONS, REHAB EVAL
As per ASA Maria patient cleared for PT/OOB. Received supine + heplock, SCDs, cryocuff right LE, aquacell dressing right knee C,D,I, in NAD

## 2021-11-04 NOTE — DISCHARGE NOTE PROVIDER - NSDCACTIVITY_GEN_ALL_CORE
Do not drive or operate machinery/Showering allowed/Walking - Indoors allowed/No heavy lifting/straining/Follow Instructions Provided by your Surgical Team

## 2021-11-04 NOTE — DISCHARGE NOTE PROVIDER - NSDCHHASSISTDEVIC_GEN_ALL_CORE_FT
Group Topic:  Education    Date: 1/26/2021  Start Time: 1830  End Time: 1930  Facilitators: Anahi Perez    Focus: Recovery Group  Number in attendance: 8  Patients were given the option of attending recreational group or AODA informational recovery group. Pt attended the evening AODA informational recovery group on the unit with Sumit PETERSONPatricia  Sumit shared about his own recovery journey and options for aftercare at the [facility]. Patient questions were answered.      Pt was recruited for group but did not attend. Efforts to encourage participation in programming on the unit will continue.    Anahi Perez        s/p total knee replacement

## 2021-11-04 NOTE — PROGRESS NOTE ADULT - SUBJECTIVE AND OBJECTIVE BOX
Procedure: R TKA  Surgeon: Kleber    Pt comfortable without complaints, pain controlled. Denies CP, SOB, N/V, numbness/tingling     Vital Signs Last 24 Hrs  T(C): 36.6 (04 Nov 2021 05:35), Max: 36.6 (03 Nov 2021 15:25)  T(F): 97.8 (04 Nov 2021 05:35), Max: 97.9 (04 Nov 2021 00:43)  HR: 72 (04 Nov 2021 05:35) (62 - 99)  BP: 114/64 (04 Nov 2021 05:35) (114/64 - 171/77)  BP(mean): 102 (03 Nov 2021 17:25) (97 - 105)  RR: 18 (04 Nov 2021 05:35) (16 - 24)  SpO2: 96% (04 Nov 2021 05:35) (95% - 100%)      Physical Exam:  General: Pt Alert and oriented, NAD  DSG aquacel C/D/I cryocuff R knee  Pulses: 2+ dp, pt pulses, toes wwp, cap refill <3 sec  Sensation: Decreased to medial aspect of leg; otherwise intact  Motor: EHL/FHL/TA/GS firing equally b/l LE                          9.9    13.84 )-----------( 305      ( 04 Nov 2021 07:00 )             31.0     11-04    144  |  110<H>  |  18  ----------------------------<  103<H>  4.4   |  22  |  0.84    Ca    8.8      04 Nov 2021 07:00      A/P: 61yFemale POD#1 s/p R TKA with Dr. Shahid on 11/3.  - Stable  - Pain Control  - AM labs reviewed  - DVT ppx: SCDs,  daily  - Post op abx: periop ancef received  - PT, WBS: WBAT  - Dispo: pending PT eval

## 2021-11-04 NOTE — DISCHARGE NOTE PROVIDER - HOSPITAL COURSE
Admitted  Surgery - right total knee arthroplasty  Trinh-op Antibiotics  Pain control  DVT prophylaxis  OOB/Physical Therapy

## 2021-11-05 LAB
ANION GAP SERPL CALC-SCNC: 9 MMOL/L — SIGNIFICANT CHANGE UP (ref 5–17)
BUN SERPL-MCNC: 12 MG/DL — SIGNIFICANT CHANGE UP (ref 7–23)
CALCIUM SERPL-MCNC: 8.8 MG/DL — SIGNIFICANT CHANGE UP (ref 8.4–10.5)
CHLORIDE SERPL-SCNC: 103 MMOL/L — SIGNIFICANT CHANGE UP (ref 96–108)
CO2 SERPL-SCNC: 27 MMOL/L — SIGNIFICANT CHANGE UP (ref 22–31)
CREAT SERPL-MCNC: 0.8 MG/DL — SIGNIFICANT CHANGE UP (ref 0.5–1.3)
GLUCOSE SERPL-MCNC: 89 MG/DL — SIGNIFICANT CHANGE UP (ref 70–99)
HCT VFR BLD CALC: 31.3 % — LOW (ref 34.5–45)
HGB BLD-MCNC: 9.7 G/DL — LOW (ref 11.5–15.5)
MCHC RBC-ENTMCNC: 26.3 PG — LOW (ref 27–34)
MCHC RBC-ENTMCNC: 31 GM/DL — LOW (ref 32–36)
MCV RBC AUTO: 84.8 FL — SIGNIFICANT CHANGE UP (ref 80–100)
NRBC # BLD: 0 /100 WBCS — SIGNIFICANT CHANGE UP (ref 0–0)
PLATELET # BLD AUTO: 266 K/UL — SIGNIFICANT CHANGE UP (ref 150–400)
POTASSIUM SERPL-MCNC: 4.5 MMOL/L — SIGNIFICANT CHANGE UP (ref 3.5–5.3)
POTASSIUM SERPL-SCNC: 4.5 MMOL/L — SIGNIFICANT CHANGE UP (ref 3.5–5.3)
RBC # BLD: 3.69 M/UL — LOW (ref 3.8–5.2)
RBC # FLD: 14.6 % — HIGH (ref 10.3–14.5)
SODIUM SERPL-SCNC: 139 MMOL/L — SIGNIFICANT CHANGE UP (ref 135–145)
WBC # BLD: 10.72 K/UL — HIGH (ref 3.8–10.5)
WBC # FLD AUTO: 10.72 K/UL — HIGH (ref 3.8–10.5)

## 2021-11-05 RX ORDER — OXYCODONE HYDROCHLORIDE 5 MG/1
1 TABLET ORAL
Qty: 42 | Refills: 0
Start: 2021-11-05 | End: 2021-11-11

## 2021-11-05 RX ORDER — HYALURONATE SODIUM, STABILIZED 88 MG/4 ML
0 SYRINGE (ML) INTRAARTICULAR
Qty: 0 | Refills: 0 | DISCHARGE

## 2021-11-05 RX ORDER — ACETAMINOPHEN 500 MG
2 TABLET ORAL
Qty: 0 | Refills: 0 | DISCHARGE
Start: 2021-11-05

## 2021-11-05 RX ORDER — ASPIRIN/CALCIUM CARB/MAGNESIUM 324 MG
1 TABLET ORAL
Qty: 14 | Refills: 0
Start: 2021-11-05 | End: 2021-11-18

## 2021-11-05 RX ADMIN — Medication 975 MILLIGRAM(S): at 13:58

## 2021-11-05 RX ADMIN — Medication 975 MILLIGRAM(S): at 21:47

## 2021-11-05 RX ADMIN — CELECOXIB 200 MILLIGRAM(S): 200 CAPSULE ORAL at 18:30

## 2021-11-05 RX ADMIN — Medication 975 MILLIGRAM(S): at 06:00

## 2021-11-05 RX ADMIN — Medication 325 MILLIGRAM(S): at 12:06

## 2021-11-05 RX ADMIN — Medication 975 MILLIGRAM(S): at 22:47

## 2021-11-05 RX ADMIN — Medication 975 MILLIGRAM(S): at 05:08

## 2021-11-05 RX ADMIN — Medication 975 MILLIGRAM(S): at 13:45

## 2021-11-05 RX ADMIN — OXYCODONE HYDROCHLORIDE 5 MILLIGRAM(S): 5 TABLET ORAL at 19:12

## 2021-11-05 RX ADMIN — OXYCODONE HYDROCHLORIDE 5 MILLIGRAM(S): 5 TABLET ORAL at 18:12

## 2021-11-05 RX ADMIN — CELECOXIB 200 MILLIGRAM(S): 200 CAPSULE ORAL at 17:56

## 2021-11-05 NOTE — PROGRESS NOTE ADULT - SUBJECTIVE AND OBJECTIVE BOX
Ortho Note    Pt comfortable without complaints, pain controlled.  Pt notes some numbness on RLE   Denies CP, SOB, N/V, numbness/tingling down lle.     Vital Signs Last 24 Hrs  T(C): 36.9 (11-05-21 @ 09:04), Max: 36.9 (11-05-21 @ 09:04)  T(F): 98.4 (11-05-21 @ 09:04), Max: 98.4 (11-05-21 @ 09:04)  HR: 68 (11-05-21 @ 09:04) (68 - 68)  BP: 126/79 (11-05-21 @ 09:04) (126/79 - 126/79)  BP(mean): --  RR: 16 (11-05-21 @ 09:04) (16 - 16)  SpO2: 96% (11-05-21 @ 09:04) (96% - 96%)      General: Pt Alert and oriented, NAD  DSG aquacel R knee C/D/I  Pulses: DPs palpable b/l le   Sensation: sensation to light touch diminished on RLE compared to LLE   Motor: EHL/FHL/TA/GS 5/5 b/l le         A/P: 61yFemale POD#2 s/p R TKA   - Stable  - Pain Control prn   - DVT ppx: asa  - PT, WBS:  WBAT  - Trend labs   - dispo home pending PT clearance     Ortho Pager [phone]

## 2021-11-05 NOTE — PROGRESS NOTE ADULT - SUBJECTIVE AND OBJECTIVE BOX
Procedure: R TKA  Surgeon: Kleber    Pt comfortable without complaints, pain controlled. Denies CP, SOB, N/V, numbness/tingling     Vital Signs Last 24 Hrs  T(C): 36.7 (05 Nov 2021 05:03), Max: 36.8 (04 Nov 2021 09:00)  T(F): 98 (05 Nov 2021 05:03), Max: 98.2 (04 Nov 2021 09:00)  HR: 67 (05 Nov 2021 05:03) (67 - 80)  BP: 144/80 (05 Nov 2021 05:03) (103/65 - 144/80)  BP(mean): 79 (04 Nov 2021 09:00) (79 - 79)  RR: 20 (05 Nov 2021 05:03) (17 - 20)  SpO2: 97% (05 Nov 2021 05:03) (94% - 97%)      Physical Exam:  General: Pt Alert and oriented, NAD  DSG aquacel C/D/I cryocuff R knee  Pulses: 2+ dp, pt pulses, toes wwp, cap refill <3 sec  Sensation: Decreased to medial aspect of leg; otherwise intact  Motor: EHL/FHL/TA/GS firing equally b/l LE                              9.9    13.84 )-----------( 305      ( 04 Nov 2021 07:00 )             31.0     11-04    144  |  110<H>  |  18  ----------------------------<  103<H>  4.4   |  22  |  0.84    Ca    8.8      04 Nov 2021 07:00               A/P: 61yFemale POD#2 s/p R TKA with Dr. Shahid on 11/3.  - Stable  - Pain Control  - AM labs reviewed  - DVT ppx: SCDs,  daily  - PT, WBS: WBAT  - Dispo: home PT

## 2021-11-06 ENCOUNTER — TRANSCRIPTION ENCOUNTER (OUTPATIENT)
Age: 61
End: 2021-11-06

## 2021-11-06 VITALS
TEMPERATURE: 99 F | SYSTOLIC BLOOD PRESSURE: 124 MMHG | OXYGEN SATURATION: 96 % | RESPIRATION RATE: 17 BRPM | HEART RATE: 65 BPM | DIASTOLIC BLOOD PRESSURE: 79 MMHG

## 2021-11-06 LAB
ANION GAP SERPL CALC-SCNC: 9 MMOL/L — SIGNIFICANT CHANGE UP (ref 5–17)
BUN SERPL-MCNC: 14 MG/DL — SIGNIFICANT CHANGE UP (ref 7–23)
CALCIUM SERPL-MCNC: 8.8 MG/DL — SIGNIFICANT CHANGE UP (ref 8.4–10.5)
CHLORIDE SERPL-SCNC: 107 MMOL/L — SIGNIFICANT CHANGE UP (ref 96–108)
CO2 SERPL-SCNC: 24 MMOL/L — SIGNIFICANT CHANGE UP (ref 22–31)
CREAT SERPL-MCNC: 0.8 MG/DL — SIGNIFICANT CHANGE UP (ref 0.5–1.3)
GLUCOSE SERPL-MCNC: 87 MG/DL — SIGNIFICANT CHANGE UP (ref 70–99)
HCT VFR BLD CALC: 29.6 % — LOW (ref 34.5–45)
HGB BLD-MCNC: 9.2 G/DL — LOW (ref 11.5–15.5)
MCHC RBC-ENTMCNC: 26.1 PG — LOW (ref 27–34)
MCHC RBC-ENTMCNC: 31.1 GM/DL — LOW (ref 32–36)
MCV RBC AUTO: 84.1 FL — SIGNIFICANT CHANGE UP (ref 80–100)
NRBC # BLD: 0 /100 WBCS — SIGNIFICANT CHANGE UP (ref 0–0)
PLATELET # BLD AUTO: 248 K/UL — SIGNIFICANT CHANGE UP (ref 150–400)
POTASSIUM SERPL-MCNC: 4 MMOL/L — SIGNIFICANT CHANGE UP (ref 3.5–5.3)
POTASSIUM SERPL-SCNC: 4 MMOL/L — SIGNIFICANT CHANGE UP (ref 3.5–5.3)
RBC # BLD: 3.52 M/UL — LOW (ref 3.8–5.2)
RBC # FLD: 14.6 % — HIGH (ref 10.3–14.5)
SODIUM SERPL-SCNC: 140 MMOL/L — SIGNIFICANT CHANGE UP (ref 135–145)
WBC # BLD: 8.97 K/UL — SIGNIFICANT CHANGE UP (ref 3.8–10.5)
WBC # FLD AUTO: 8.97 K/UL — SIGNIFICANT CHANGE UP (ref 3.8–10.5)

## 2021-11-06 PROCEDURE — C1776: CPT

## 2021-11-06 PROCEDURE — 73560 X-RAY EXAM OF KNEE 1 OR 2: CPT

## 2021-11-06 PROCEDURE — 86769 SARS-COV-2 COVID-19 ANTIBODY: CPT

## 2021-11-06 PROCEDURE — C1713: CPT

## 2021-11-06 PROCEDURE — 36415 COLL VENOUS BLD VENIPUNCTURE: CPT

## 2021-11-06 PROCEDURE — 86803 HEPATITIS C AB TEST: CPT

## 2021-11-06 PROCEDURE — 97116 GAIT TRAINING THERAPY: CPT

## 2021-11-06 PROCEDURE — 80048 BASIC METABOLIC PNL TOTAL CA: CPT

## 2021-11-06 PROCEDURE — 85027 COMPLETE CBC AUTOMATED: CPT

## 2021-11-06 PROCEDURE — 97530 THERAPEUTIC ACTIVITIES: CPT

## 2021-11-06 RX ADMIN — Medication 325 MILLIGRAM(S): at 12:07

## 2021-11-06 RX ADMIN — CELECOXIB 200 MILLIGRAM(S): 200 CAPSULE ORAL at 06:57

## 2021-11-06 RX ADMIN — CELECOXIB 200 MILLIGRAM(S): 200 CAPSULE ORAL at 05:57

## 2021-11-06 RX ADMIN — Medication 975 MILLIGRAM(S): at 05:57

## 2021-11-06 RX ADMIN — Medication 975 MILLIGRAM(S): at 06:57

## 2021-11-06 NOTE — PROGRESS NOTE ADULT - SUBJECTIVE AND OBJECTIVE BOX
Ortho Note    Pt comfortable without complaints, pain controlled. Denies CP, SOB, N/V, numbness/tingling     Vital Signs Last 24 Hrs  T(C): 36.7 (06 Nov 2021 04:50), Max: 36.9 (05 Nov 2021 09:04)  T(F): 98 (06 Nov 2021 04:50), Max: 98.4 (05 Nov 2021 09:04)  HR: 65 (06 Nov 2021 04:50) (65 - 71)  BP: 108/65 (06 Nov 2021 04:50) (108/65 - 137/67)  BP(mean): --  RR: 16 (06 Nov 2021 04:50) (15 - 18)  SpO2: 97% (06 Nov 2021 04:50) (96% - 97%)    Physical Exam:  General: Pt Alert and oriented, NAD  DSG aquacel C/D/I cryocuff R knee  Pulses: 2+ dp, pt pulses, toes wwp, cap refill <3 sec  Sensation: Decreased to medial aspect of leg; otherwise intact  Motor: EHL/FHL/TA/GS firing equally b/l LE            A/P: 61yFemale s/p R TKA with Dr. Shahid on 11/3.  - Stable  - Pain Control  - AM labs reviewed  - DVT ppx: SCDs,  daily  - PT, WBS: WBAT  - Dispo: HPT when clears

## 2021-11-06 NOTE — DISCHARGE NOTE NURSING/CASE MANAGEMENT/SOCIAL WORK - PATIENT PORTAL LINK FT
You can access the FollowMyHealth Patient Portal offered by [facility] by registering at the following website: [email or web address]. By joining [facility]’s FollowMyHealth portal, you will also be able to view your health information using other applications (apps) compatible with our system.

## 2021-11-11 DIAGNOSIS — M17.11 UNILATERAL PRIMARY OSTEOARTHRITIS, RIGHT KNEE: ICD-10-CM

## 2021-11-11 DIAGNOSIS — F17.200 NICOTINE DEPENDENCE, UNSPECIFIED, UNCOMPLICATED: ICD-10-CM

## 2021-11-30 ENCOUNTER — APPOINTMENT (OUTPATIENT)
Dept: ORTHOPEDIC SURGERY | Facility: CLINIC | Age: 61
End: 2021-11-30
Payer: OTHER MISCELLANEOUS

## 2021-11-30 PROCEDURE — 99024 POSTOP FOLLOW-UP VISIT: CPT

## 2021-11-30 NOTE — DISCUSSION/SUMMARY
[de-identified] : Patient had a long talk about the fact that she has very limited range of motion which is unusual for 4 weeks out from the date of surgery. There are no clinical signs to indicate any significant worry for infection. We had a discussion about our options patient recent outpatient physical therapy for 3 weeks 3 times a week to improve range of motion she'll return in 3 weeks if not improved we are talk about potential manipulation under anesthesia.

## 2021-11-30 NOTE — PHYSICAL EXAM
[de-identified] : Patient's range of motion is 5-20°. The wound is well healed there is no erythema no undue warmth. [de-identified] : None today due to the fact that the patient has minimal flexion we will do these x-rays on next visit.

## 2021-11-30 NOTE — HISTORY OF PRESENT ILLNESS
[de-identified] : Patient is here for week status post right knee replacement. Patient has been doing in-home physical therapy for the 4 weeks since she was discharged. Patient is complaining of stiffness of the knee but no pain.

## 2021-12-02 ENCOUNTER — FORM ENCOUNTER (OUTPATIENT)
Age: 61
End: 2021-12-02

## 2022-01-04 ENCOUNTER — APPOINTMENT (OUTPATIENT)
Dept: ORTHOPEDIC SURGERY | Facility: CLINIC | Age: 62
End: 2022-01-04
Payer: OTHER MISCELLANEOUS

## 2022-01-04 PROCEDURE — 73562 X-RAY EXAM OF KNEE 3: CPT | Mod: RT

## 2022-01-04 PROCEDURE — 99024 POSTOP FOLLOW-UP VISIT: CPT

## 2022-01-04 NOTE — DISCUSSION/SUMMARY
[Surgical risks reviewed] : Surgical risks reviewed [de-identified] : We discussed her options the patient needs to improve her flexion she'll continue with physical therapy but I advised her I recommend a manipulation under anesthesia. Regional risks and benefits of the procedure discussed in detail we'll try to accommodate her in the next 10 days.

## 2022-01-04 NOTE — PHYSICAL EXAM
[de-identified] : Patient's range of motion is 3- 85°. Wound is well healed there is some mild warmth no obvious effusion no drainage no erythema. [de-identified] : AP standing individual lateral sunrise views were obtained showing excellent component position of a Fabian and nephew type right knee prosthesis.

## 2022-01-04 NOTE — HISTORY OF PRESENT ILLNESS
[de-identified] : Patient is 2 months status post right knee replacement. She's been going to physical therapy. She states she has no problems with extending the knee but does have difficulty with bending.

## 2022-03-10 ENCOUNTER — APPOINTMENT (OUTPATIENT)
Dept: ORTHOPEDIC SURGERY | Facility: AMBULATORY SURGERY CENTER | Age: 62
End: 2022-03-10
Payer: OTHER MISCELLANEOUS

## 2022-03-10 PROCEDURE — 27570 FIXATION OF KNEE JOINT: CPT | Mod: RT

## 2022-03-10 PROCEDURE — 99214 OFFICE O/P EST MOD 30 MIN: CPT | Mod: 1L

## 2022-03-22 ENCOUNTER — APPOINTMENT (OUTPATIENT)
Dept: ORTHOPEDIC SURGERY | Facility: CLINIC | Age: 62
End: 2022-03-22

## 2022-03-22 PROCEDURE — 73562 X-RAY EXAM OF KNEE 3: CPT | Mod: RT

## 2022-03-22 PROCEDURE — 99024 POSTOP FOLLOW-UP VISIT: CPT | Mod: 1L

## 2022-03-22 NOTE — PHYSICAL EXAM
[de-identified] : Patient has tenderness palpation of the quadriceps insertion point as well as patella tendon. The knee is stable to AP stress varus valgus stress and full extension patient due to discomfort has difficulty flexing beyond 30° today.Patient has a palpable intact extensor mechanism and is able to actively extend from 30 to full extension. [de-identified] : X-rays were ordered of the right knee today. AP and lateral of the right knee were examined compared to pre-manipulation radiographs there is no difference noted on the AP projection special attention as to aid the lateral projection no evidence of femorotibial fracture there is a small avulsion anuel noted of the distal patella with a few millimeters of separation.

## 2022-03-22 NOTE — HISTORY OF PRESENT ILLNESS
[de-identified] : Patient returns today after manipulation 12 days ago. She is in a fair amount of discomfort her postmanipulation range of motion is 0-125°. However because of discomfort she is having difficulty with flexing.

## 2022-03-22 NOTE — DISCUSSION/SUMMARY
[de-identified] : Patient has sustained an small avulsion fracture of the distal pole of the patella with an intact extensor mechanism. We'll keep her in a knee immobilizer for 3 weeks placed on a Medrol dose pack to help reduce some of her discomfort and swelling she was also given oxycodone #5 limited supply to help with her discomfort she will follow up with me in 3 weeks' time for repeat evaluation of her extensor mechanism when there is less swelling.

## 2022-04-12 ENCOUNTER — APPOINTMENT (OUTPATIENT)
Dept: ORTHOPEDIC SURGERY | Facility: CLINIC | Age: 62
End: 2022-04-12

## 2022-04-12 PROCEDURE — 99024 POSTOP FOLLOW-UP VISIT: CPT | Mod: 1L

## 2022-04-12 NOTE — HISTORY OF PRESENT ILLNESS
[de-identified] : Patient returns today she is status post knee replacement surgery manipulation under anesthesia. Patient sustained a nondisplaced avulsion of the distal patella her extensor mechanism has always remained intact. She is now here for repeat evaluation.

## 2022-04-12 NOTE — DISCUSSION/SUMMARY
[de-identified] : Plan as the patient can stop using her splint in the household she can resort to using it if she feels unstable or uncomfortable however him encouraging her to begin active range of motion she was sent to physical therapy to begin improving her terminal flexion follow up in one month's time.

## 2022-05-10 ENCOUNTER — APPOINTMENT (OUTPATIENT)
Dept: ORTHOPEDIC SURGERY | Facility: CLINIC | Age: 62
End: 2022-05-10

## 2022-05-10 ENCOUNTER — FORM ENCOUNTER (OUTPATIENT)
Age: 62
End: 2022-05-10

## 2022-05-10 PROCEDURE — 99024 POSTOP FOLLOW-UP VISIT: CPT | Mod: 1L

## 2022-05-10 NOTE — DISCUSSION/SUMMARY
[de-identified] : Patient will discontinue use of the knee immobilizer begin formal aggressive physical therapy to help improve her terminal flexion.  We will reassess her in 6 weeks time.  We long discussion today about our options if she does not improve her flexion.  We may consider repeat manipulation however the previous manipulation has not give us any sustained improvement in terms of flexion.  We will decide either to leave the knee as is and continue with observation or potentially undergo full revision of the implants and scar tissue excision.  Patient will follow up in 6 weeks time.

## 2022-05-10 NOTE — PHYSICAL EXAM
[de-identified] : Patient's range of motion is 0 to 45 degrees.  She has good extension strength and actively can extend the knee to full extension.  There is mild to minimal swelling or warmth about the knee.

## 2022-05-10 NOTE — HISTORY OF PRESENT ILLNESS
[de-identified] : Patient returns today she is status post knee replacement and revision due to stiffness.  Recall she had delayed physical therapy due to her insurance coverage and authorization delays for her PT.  She underwent a manipulation which improved her flexion to 90 degrees but she has been placed in a knee immobilizer due to a small avulsion fracture nondisplaced of the patella.  This by now should have been healed.  Patient is using a knee immobilizer.

## 2022-05-12 ENCOUNTER — FORM ENCOUNTER (OUTPATIENT)
Age: 62
End: 2022-05-12

## 2022-05-16 ENCOUNTER — FORM ENCOUNTER (OUTPATIENT)
Age: 62
End: 2022-05-16

## 2022-06-07 ENCOUNTER — APPOINTMENT (OUTPATIENT)
Dept: ORTHOPEDIC SURGERY | Facility: CLINIC | Age: 62
End: 2022-06-07

## 2022-06-07 PROCEDURE — 73562 X-RAY EXAM OF KNEE 3: CPT | Mod: RT

## 2022-06-07 PROCEDURE — 99072 ADDL SUPL MATRL&STAF TM PHE: CPT

## 2022-06-07 PROCEDURE — 99214 OFFICE O/P EST MOD 30 MIN: CPT

## 2022-06-07 NOTE — HISTORY OF PRESENT ILLNESS
[de-identified] : Patient returns today she is here to discuss revision surgery for her right knee.  Recall the patient underwent uncomplicated right knee replacement.  She had a complicated postoperative course with scar formation due to lack of appropriate timely physical therapy patient now lacks any flexion beyond 25 degrees.  She had a previous manipulation under anesthesia which did not significantly improve her range of motion.

## 2022-06-07 NOTE — REASON FOR VISIT
[FreeTextEntry2] : c/p of Rt knee pain and stiffness. Also unable to bend the knee. s/p Rt knee manipalation 3/10/22. Pt has been seen PT twice/week

## 2022-06-07 NOTE — DISCUSSION/SUMMARY
[de-identified] : I had a long discussion with the patient today about the reasonable risks and benefits of revision surgery.  I indicated that we would at a minimum perform an open synovectomy and probably a revision of the tibial component if not all components.  Patient understands the reasonable risks of the revision surgery which include resumption of stiffness infection instability and ultimately possible stiffness not improved from the preoperative state.  I do believe that she will improve her range of motion but I have insisted the patient know we cannot predict for sure how much flexion we would improve.  Patient understands these options she asked appropriate questions which were answered to her satisfaction.  Patient will be sent for standard lab work to rule out basic infection.

## 2022-06-07 NOTE — PHYSICAL EXAM
[de-identified] : Range of motion 0 to 35 degrees.  Patient has minimal warmth about the knee extensor mechanism is intact. [de-identified] : Knee x-rays were ordered today AP and lateral of the right knee were obtained showing total knee replacement in appropriate position no evidence of fracture or other abnormalities.

## 2022-06-23 NOTE — PHYSICAL EXAM
[de-identified] : Range of motion 0 to 35 degrees.  Patient has minimal warmth about the knee extensor mechanism is intact. [de-identified] : Knee x-rays were ordered today AP and lateral of the right knee were obtained showing total knee replacement in appropriate position no evidence of fracture or other abnormalities.

## 2022-06-23 NOTE — HISTORY OF PRESENT ILLNESS
[de-identified] : Patient returns today she is here to discuss revision surgery for her right knee.  Recall the patient underwent uncomplicated right knee replacement.  She had a complicated postoperative course with scar formation due to lack of appropriate timely physical therapy patient now lacks any flexion beyond 25 degrees.  She had a previous manipulation under anesthesia which did not significantly improve her range of motion.

## 2022-06-23 NOTE — DISCUSSION/SUMMARY
[de-identified] : I had a long discussion with the patient today about the reasonable risks and benefits of revision surgery.  I indicated that we would at a minimum perform an open synovectomy and probably a revision of the tibial component if not all components.  Patient understands the reasonable risks of the revision surgery which include resumption of stiffness infection instability and ultimately possible stiffness not improved from the preoperative state.  I do believe that she will improve her range of motion but I have insisted the patient know we cannot predict for sure how much flexion we would improve.  Patient understands these options she asked appropriate questions which were answered to her satisfaction.  Patient will be sent for standard lab work to rule out basic infection.

## 2022-06-23 NOTE — REASON FOR VISIT
[Post Operative Visit] : a post operative visit for [FreeTextEntry2] : s/p rt knee surgery on 3/10/22, now rt knee pain & stiffness.

## 2022-08-09 ENCOUNTER — TRANSCRIPTION ENCOUNTER (OUTPATIENT)
Age: 62
End: 2022-08-09

## 2022-08-09 VITALS
WEIGHT: 160.5 LBS | HEART RATE: 67 BPM | TEMPERATURE: 98 F | DIASTOLIC BLOOD PRESSURE: 78 MMHG | RESPIRATION RATE: 16 BRPM | OXYGEN SATURATION: 97 % | HEIGHT: 62 IN | SYSTOLIC BLOOD PRESSURE: 163 MMHG

## 2022-08-09 RX ORDER — POVIDONE-IODINE 5 %
1 AEROSOL (ML) TOPICAL ONCE
Refills: 0 | Status: COMPLETED | OUTPATIENT
Start: 2022-08-10 | End: 2022-08-10

## 2022-08-09 RX ORDER — OXYCODONE HYDROCHLORIDE 5 MG/1
20 TABLET ORAL ONCE
Refills: 0 | Status: DISCONTINUED | OUTPATIENT
Start: 2022-08-10 | End: 2022-08-12

## 2022-08-09 RX ORDER — CELECOXIB 200 MG/1
1 CAPSULE ORAL
Qty: 0 | Refills: 0 | DISCHARGE

## 2022-08-09 NOTE — H&P ADULT - HISTORY OF PRESENT ILLNESS
63yo f c/o right knee pain x   Presents today for elective RIGHT TKR REVISION.  61yo f c/o right knee pain x 9 months. Patient underwent R TKR in November 2021 without intraoperative complications. She did experience postoperative stiffness and underwent MASOOD in March 2022. She did not have significant relief of stiffness or pain and now presents for right total knee revisions. She currently uses a cane for assistance with ambulation.    Presents today for elective RIGHT TKR REVISION.

## 2022-08-09 NOTE — H&P ADULT - NSHPPHYSICALEXAM_GEN_ALL_CORE
PE:  Decreased ROM secondary to pain. Rest of PE per medical clearance. Gen: 62 F, NAD  MSK: Decreased right knee ROM secondary to pain  Skin without erythema, ecchymosis, abrasions or lesions, signs of infection; well healed surgical incision right knee  Calves soft, nontender bilaterally   Sensation intact to light touch bilateral lower extremities; Right diminished compared to left  Pulses: 2+ bilat LE; brisk capillary refill  EHL/FHL/TA/GS 5/5 bilaterally       Rest of PE per MD clearance

## 2022-08-09 NOTE — H&P ADULT - NSHPLABSRESULTS_GEN_ALL_CORE
preop cbc/bmp/coags/ua wnl per medical clearance  Cr 0.97  Preop EKG wnl per clearance  covid negative 8/8/22

## 2022-08-09 NOTE — H&P ADULT - PROBLEM SELECTOR PLAN 1
Admit to Orthopaedic Service.  Presents today for elective RIGHT TKR REVISION.   Pt medically stable and cleared for procedure today by  Admit to Orthopaedic Service.  Presents today for elective RIGHT TKR REVISION.   Pt medically stable and cleared for procedure today by Dr. Thao.

## 2022-08-09 NOTE — PATIENT PROFILE ADULT - NSPROGENOTHERPROVIDER_GEN_A_NUR
[facility] [address]    [address] [place] [facility]\"" [address]    Phone:  [phone]    Fax:  [phone]       Thank You for choosing us for your health care visit. We are glad to serve you and happy to provide you with this summary of your visit. Please help us to ensure we have accurate records. If you find anything that needs to be changed, please let our staff know as soon as possible.          Your Demographic Information     Patient Name Sex Shagufta Hernandez Female 1941       Ethnic Group Patient Race    Not of  or  Origin White      Your Visit Details     Date & Time Provider Department    2017 9:45 AM [facility]      Your Upcoming Appointment*(Max 10)     2017 10:50 AM CDT   Lab Visit with [facility]   [facility] ([facility])    [address]  [place] [address]   [phone]            2017  9:00 AM CDT   Fasting Lab with [facility]   [facility] ([facility])    [address] [place] [address]   [phone]             10:30 AM CDT   Medicare Well Visit with Julio Cesar Neves DO   [facility] [address] ([facility])    [address] [place] [address]   [phone]              Your To Do List     Future Orders Please Complete On or Around Expires    XR LUMBAR SPINE 2 OR 3 VIEWS  2017 Sep 06, 2017      Conditions Discussed Today or Order-Related Diagnoses        Comments    Hypercholesterolemia    -  Primary     Essential hypertension, benign         Bilateral hip pain           Your Vitals Were     BP Pulse Temp Resp Height Weight    164/90 (BP Location: E, Patient Position: Sitting, Cuff Size: Regular) 64 98.1 °F (36.7 °C) (Oral) 12 5' 3.75\" (1.619 m)  142 lb 9.6 oz (64.7 kg)    BMI Smoking Status                24.67 kg/m2 Never Smoker          Medications Prescribed or Re-Ordered Today     methylPREDNISolone (MEDROL DOSEPAK) 4 MG tablet    Sig: follow package directions    Class: Eprescribe    Pharmacy: [facility] [address] [place] [address] #: [phone]      Your Current Medications Are        Disp Refills Start End    Misc Natural Products (GLUCOSAMINE CHONDROITIN TRIPLE) Tab        Class: Historical Med    Route: Oral    aspirin 81 MG tablet        Sig - Route: Take 81 mg by mouth daily. - Oral    Class: Historical Med    cholecalciferol (VITAMIN D3) 1000 UNITS tablet        Sig - Route: Take 1,000 Units by mouth daily as needed. - Oral    Class: Historical Med    Probiotic Product (PROBIOTIC DAILY PO)        Sig - Route: Take by mouth daily. - Oral    Class: Historical Med    Multiple Vitamins-Minerals (CENTRUM SILVER) tablet        Sig - Route: Take 1 tablet by mouth daily. - Oral    Class: Historical Med    Psyllium-Calcium (METAMUCIL PLUS CALCIUM PO)        Sig - Route: Take  by mouth 2 times daily. - Oral    Class: Historical Med    Ascorbic Acid (VITAMIN C) 1000 MG tablet        Sig - Route: Take 1,000 mg by mouth daily. - Oral    Class: Historical Med    Dispense (CHECK, UNKNOWN CONCENTRATION)        Sig: Fish Oil oral capsule- 1 cap BID    Class: Historical Med    Dispense (CHECK, UNKNOWN CONCENTRATION)        Sig: Cranberry- 2 tablets daily    Class: Historical Med    Calcium Carbonate-Vitamin D (CALCIUM 600+D) 600-200 MG-UNIT TABS        Sig - Route: Take 1 tablet by mouth 2 times daily. - Oral    Class: Historical Med    rosuvastatin (CRESTOR) 5 MG tablet        Sig - Route: Take 5 mg by mouth daily. - Oral    Class: Historical Med    methylPREDNISolone (MEDROL DOSEPAK) 4 MG tablet 21 tablet 0 6/8/2017     Sig: follow package directions    Class: Eprescribe    fexofenadine (ALLEGRA) 180 MG tablet        Sig - Route: Take  180 mg by mouth daily. - Oral    Class: Historical Med    trimethoprim (PROLOPRIM) 100 MG tablet 45 tablet 1 7/11/2016     Sig - Route: Take 0.5 tablets by mouth nightly. Prevention of urinary tract infections. Long-term. No stop date. - Oral    Class: Eprescribe      Discontinued Medications        Reason for Discontinue    alendronate (FOSAMAX) 70 MG tablet Therapy Completed    nitrofurantoin, macrocrystal-monohydrate, (MACROBID) 100 MG capsule Therapy Completed    simvastatin (ZOCOR) 20 MG tablet Error    simvastatin (ZOCOR) 20 MG tablet Error      Allergies     Penicillins RASH    Had rash over 25 years ago from 6-5-14      Immunizations History as of 6/8/2017     Name Date    HERPES ZOSTER SHINGLES 6/29/2007    Influenza 10/1/2012, 10/13/2011, 9/28/2010, 10/1/2009, 10/15/2008    Influenza High Dose Pres Free 10/27/2016, 10/13/2015, 10/10/2014, 10/3/2013    Pneumococcal Conjugate 13 Valent 7/11/2016 10:51 AM    Pneumococcal Polysaccharide Adult 12/27/2011    Tdap 12/27/2011      Problem List as of 6/8/2017     Hypercholesterolemia    Malignant neoplasm of breast (female), unspecified site    Allergic rhinitis, cause unspecified    Essential hypertension, benign    Osteoporosis, unspecified            Patient Instructions     None       none

## 2022-08-09 NOTE — H&P ADULT - NSICDXPASTSURGICALHX_GEN_ALL_CORE_FT
PAST SURGICAL HISTORY:  H/O arthroscopy of right knee      PAST SURGICAL HISTORY:  H/O arthroscopy of right knee     S/P total knee replacement, right 11/21

## 2022-08-09 NOTE — H&P ADULT - NSICDXPASTMEDICALHX_GEN_ALL_CORE_FT
PAST MEDICAL HISTORY:  No pertinent past medical history      PAST MEDICAL HISTORY:  Pain in right knee

## 2022-08-10 ENCOUNTER — INPATIENT (INPATIENT)
Facility: HOSPITAL | Age: 62
LOS: 1 days | Discharge: HOME HEALTH | DRG: 465 | End: 2022-08-12
Attending: SPECIALIST | Admitting: SPECIALIST
Payer: OTHER MISCELLANEOUS

## 2022-08-10 ENCOUNTER — APPOINTMENT (OUTPATIENT)
Dept: ORTHOPEDIC SURGERY | Facility: HOSPITAL | Age: 62
End: 2022-08-10

## 2022-08-10 ENCOUNTER — TRANSCRIPTION ENCOUNTER (OUTPATIENT)
Age: 62
End: 2022-08-10

## 2022-08-10 DIAGNOSIS — M25.561 PAIN IN RIGHT KNEE: ICD-10-CM

## 2022-08-10 DIAGNOSIS — Z96.651 PRESENCE OF RIGHT ARTIFICIAL KNEE JOINT: Chronic | ICD-10-CM

## 2022-08-10 DIAGNOSIS — Z98.890 OTHER SPECIFIED POSTPROCEDURAL STATES: Chronic | ICD-10-CM

## 2022-08-10 PROCEDURE — 73560 X-RAY EXAM OF KNEE 1 OR 2: CPT | Mod: 26,RT

## 2022-08-10 PROCEDURE — 27486 REVISE/REPLACE KNEE JOINT: CPT | Mod: RT

## 2022-08-10 PROCEDURE — 27486 REVISE/REPLACE KNEE JOINT: CPT | Mod: AS,RT

## 2022-08-10 DEVICE — PIN TROCAR GEN 1/8 X 5IN: Type: IMPLANTABLE DEVICE | Site: RIGHT | Status: FUNCTIONAL

## 2022-08-10 DEVICE — PIN TROCAR GEN 1/8 X 3IN: Type: IMPLANTABLE DEVICE | Site: RIGHT | Status: FUNCTIONAL

## 2022-08-10 DEVICE — IMPLANTABLE DEVICE: Type: IMPLANTABLE DEVICE | Site: RIGHT | Status: FUNCTIONAL

## 2022-08-10 DEVICE — PATELLA 7.5 32MM: Type: IMPLANTABLE DEVICE | Site: RIGHT | Status: FUNCTIONAL

## 2022-08-10 RX ORDER — MAGNESIUM HYDROXIDE 400 MG/1
30 TABLET, CHEWABLE ORAL DAILY
Refills: 0 | Status: DISCONTINUED | OUTPATIENT
Start: 2022-08-10 | End: 2022-08-12

## 2022-08-10 RX ORDER — CELECOXIB 200 MG/1
400 CAPSULE ORAL ONCE
Refills: 0 | Status: COMPLETED | OUTPATIENT
Start: 2022-08-10 | End: 2022-08-10

## 2022-08-10 RX ORDER — ASPIRIN/CALCIUM CARB/MAGNESIUM 324 MG
325 TABLET ORAL DAILY
Refills: 0 | Status: DISCONTINUED | OUTPATIENT
Start: 2022-08-10 | End: 2022-08-12

## 2022-08-10 RX ORDER — ACETAMINOPHEN 500 MG
650 TABLET ORAL EVERY 6 HOURS
Refills: 0 | Status: DISCONTINUED | OUTPATIENT
Start: 2022-08-10 | End: 2022-08-12

## 2022-08-10 RX ORDER — CEFAZOLIN SODIUM 1 G
2000 VIAL (EA) INJECTION EVERY 8 HOURS
Refills: 0 | Status: COMPLETED | OUTPATIENT
Start: 2022-08-10 | End: 2022-08-11

## 2022-08-10 RX ORDER — OXYCODONE HYDROCHLORIDE 5 MG/1
10 TABLET ORAL
Refills: 0 | Status: DISCONTINUED | OUTPATIENT
Start: 2022-08-10 | End: 2022-08-12

## 2022-08-10 RX ORDER — HYDROMORPHONE HYDROCHLORIDE 2 MG/ML
0.5 INJECTION INTRAMUSCULAR; INTRAVENOUS; SUBCUTANEOUS ONCE
Refills: 0 | Status: DISCONTINUED | OUTPATIENT
Start: 2022-08-10 | End: 2022-08-10

## 2022-08-10 RX ORDER — ONDANSETRON 8 MG/1
4 TABLET, FILM COATED ORAL EVERY 6 HOURS
Refills: 0 | Status: DISCONTINUED | OUTPATIENT
Start: 2022-08-10 | End: 2022-08-12

## 2022-08-10 RX ORDER — CHLORHEXIDINE GLUCONATE 213 G/1000ML
1 SOLUTION TOPICAL ONCE
Refills: 0 | Status: COMPLETED | OUTPATIENT
Start: 2022-08-10 | End: 2022-08-10

## 2022-08-10 RX ORDER — SENNA PLUS 8.6 MG/1
2 TABLET ORAL AT BEDTIME
Refills: 0 | Status: DISCONTINUED | OUTPATIENT
Start: 2022-08-10 | End: 2022-08-12

## 2022-08-10 RX ORDER — CELECOXIB 200 MG/1
200 CAPSULE ORAL EVERY 12 HOURS
Refills: 0 | Status: DISCONTINUED | OUTPATIENT
Start: 2022-08-11 | End: 2022-08-12

## 2022-08-10 RX ORDER — SODIUM CHLORIDE 9 MG/ML
1000 INJECTION, SOLUTION INTRAVENOUS
Refills: 0 | Status: DISCONTINUED | OUTPATIENT
Start: 2022-08-11 | End: 2022-08-12

## 2022-08-10 RX ORDER — POLYETHYLENE GLYCOL 3350 17 G/17G
17 POWDER, FOR SOLUTION ORAL AT BEDTIME
Refills: 0 | Status: DISCONTINUED | OUTPATIENT
Start: 2022-08-10 | End: 2022-08-12

## 2022-08-10 RX ORDER — OXYCODONE HYDROCHLORIDE 5 MG/1
5 TABLET ORAL
Refills: 0 | Status: DISCONTINUED | OUTPATIENT
Start: 2022-08-10 | End: 2022-08-12

## 2022-08-10 RX ADMIN — OXYCODONE HYDROCHLORIDE 5 MILLIGRAM(S): 5 TABLET ORAL at 20:05

## 2022-08-10 RX ADMIN — CELECOXIB 400 MILLIGRAM(S): 200 CAPSULE ORAL at 11:41

## 2022-08-10 RX ADMIN — HYDROMORPHONE HYDROCHLORIDE 0.5 MILLIGRAM(S): 2 INJECTION INTRAMUSCULAR; INTRAVENOUS; SUBCUTANEOUS at 20:18

## 2022-08-10 RX ADMIN — Medication 650 MILLIGRAM(S): at 17:29

## 2022-08-10 RX ADMIN — HYDROMORPHONE HYDROCHLORIDE 0.5 MILLIGRAM(S): 2 INJECTION INTRAMUSCULAR; INTRAVENOUS; SUBCUTANEOUS at 20:03

## 2022-08-10 RX ADMIN — CELECOXIB 400 MILLIGRAM(S): 200 CAPSULE ORAL at 11:29

## 2022-08-10 RX ADMIN — CHLORHEXIDINE GLUCONATE 1 APPLICATION(S): 213 SOLUTION TOPICAL at 11:27

## 2022-08-10 RX ADMIN — OXYCODONE HYDROCHLORIDE 5 MILLIGRAM(S): 5 TABLET ORAL at 19:02

## 2022-08-10 RX ADMIN — Medication 1 APPLICATION(S): at 11:27

## 2022-08-10 NOTE — PROGRESS NOTE ADULT - SUBJECTIVE AND OBJECTIVE BOX
Ortho Post Op Check    Procedure: Revision R TKA, patella polyethylene spacer exchange, scar excision  Surgeon: Kleber    Pt comfortable without complaints, pain controlled. PAtient cleared PT in PACU but did not have a ride home, opted to stay overnight, pending d/c in AM.  Denies CP, SOB, N/V, numbness/tingling     Vital Signs Last 24 Hrs  T(C): 35.8 (08-10-22 @ 15:17), Max: 35.8 (08-10-22 @ 15:17)  T(F): 96.5 (08-10-22 @ 15:17), Max: 96.5 (08-10-22 @ 15:17)  HR: 56 (08-10-22 @ 17:42) (54 - 56)  BP: 161/75 (08-10-22 @ 17:42) (133/75 - 161/90)  BP(mean): 108 (08-10-22 @ 17:42) (99 - 121)  RR: 18 (08-10-22 @ 17:42) (13 - 22)  SpO2: 92% (08-10-22 @ 17:42) (92% - 100%)  I&O's Summary    10 Aug 2022 07:01  -  10 Aug 2022 18:28  --------------------------------------------------------  IN: 1300 mL / OUT: 0 mL / NET: 1300 mL        Physical Exam:  General: Pt Alert and oriented, NAD  R knee with CAM dressing, C/d/i  Pulses: 2+ dp, pt pulses, wwp, cap refill <3 sec  Sensation: SILT in sural/saph/sp/dp/tibial distributions  Motor: EHL/FHL/TA/GS firing              Post-op X-Ray:  Xray R knee (AP/Lateral): hardware in appropriate alignment, no fracture    A/P: 62yFemale POD#0 s/p R revision TKA, scar excision, patellar polyethylene exchange on 8/10  - Stable  - Pain Control  - f/u AM labs  - DVT ppx: ASA  - Post op abx: periop ancef  - PT, WBS: WBAT  - Dispo: home 8/11, cleared PT    Syd Reynoso, PGY-2  Ortho Pager [phone] Ortho Post Op Check    Procedure: Revision R TKA, patella polyethylene spacer exchange, scar excision  Surgeon: Kleber    Pt comfortable without complaints, pain controlled. PAtient cleared PT in PACU but did not have a ride home, opted to stay overnight, pending d/c in AM.  Denies CP, SOB, N/V, numbness/tingling     Vital Signs Last 24 Hrs  T(C): 35.8 (08-10-22 @ 15:17), Max: 35.8 (08-10-22 @ 15:17)  T(F): 96.5 (08-10-22 @ 15:17), Max: 96.5 (08-10-22 @ 15:17)  HR: 56 (08-10-22 @ 17:42) (54 - 56)  BP: 161/75 (08-10-22 @ 17:42) (133/75 - 161/90)  BP(mean): 108 (08-10-22 @ 17:42) (99 - 121)  RR: 18 (08-10-22 @ 17:42) (13 - 22)  SpO2: 92% (08-10-22 @ 17:42) (92% - 100%)  I&O's Summary    10 Aug 2022 07:01  -  10 Aug 2022 18:28  --------------------------------------------------------  IN: 1300 mL / OUT: 0 mL / NET: 1300 mL        Physical Exam:  General: Pt Alert and oriented, NAD  R knee with CAM dressing, C/d/i  Pulses: 2+ dp, pt pulses, wwp, cap refill <3 sec  Sensation: Decreased sensation in sural/saph/sp/dp/tibial distributions 2/2 nerve block (improving per pt)  Motor: EHL/FHL/TA/GS firing weekly due to nerve block (improving per pt)              Post-op X-Ray:  Xray R knee (AP/Lateral): hardware in appropriate alignment, no fracture    A/P: 62yFemale POD#0 s/p R revision TKA, scar excision, patellar polyethylene exchange on 8/10  - Stable  - Pain Control  - f/u AM labs  - DVT ppx: ASA  - Post op abx: periop ancef  - PT, WBS: WBAT  - Dispo: home 8/11, cleared PT    Syd Reynoso, PGY-2  Ortho Pager [phone] Ortho Post Op Check    Procedure: Revision R TKA, patella polyethylene spacer exchange, scar excision  Surgeon: Kleber    Pt comfortable without complaints, pain controlled. pending PT eval.  Denies CP, SOB, N/V, numbness/tingling     Vital Signs Last 24 Hrs  T(C): 35.8 (08-10-22 @ 15:17), Max: 35.8 (08-10-22 @ 15:17)  T(F): 96.5 (08-10-22 @ 15:17), Max: 96.5 (08-10-22 @ 15:17)  HR: 56 (08-10-22 @ 17:42) (54 - 56)  BP: 161/75 (08-10-22 @ 17:42) (133/75 - 161/90)  BP(mean): 108 (08-10-22 @ 17:42) (99 - 121)  RR: 18 (08-10-22 @ 17:42) (13 - 22)  SpO2: 92% (08-10-22 @ 17:42) (92% - 100%)  I&O's Summary    10 Aug 2022 07:01  -  10 Aug 2022 18:28  --------------------------------------------------------  IN: 1300 mL / OUT: 0 mL / NET: 1300 mL        Physical Exam:  General: Pt Alert and oriented, NAD  R knee with CAM dressing, C/d/i  Pulses: 2+ dp, pt pulses, wwp, cap refill <3 sec  Sensation: Decreased sensation in sural/saph/sp/dp/tibial distributions 2/2 nerve block (improving per pt)  Motor: EHL/FHL/TA/GS firing weekly due to nerve block (improving per pt)              Post-op X-Ray:  Xray R knee (AP/Lateral): hardware in appropriate alignment, no fracture    A/P: 62yFemale POD#0 s/p R revision TKA, scar excision, patellar polyethylene exchange on 8/10  - Stable  - Pain Control  - f/u AM labs  - DVT ppx: ASA  - Post op abx: periop ancef  - PT, WBS: WBAT  - Dispo: home pending PTC    Syd Reynoso, PGY-2  Ortho Pager [phone]

## 2022-08-10 NOTE — BRIEF OPERATIVE NOTE - NSICDXBRIEFPROCEDURE_GEN_ALL_CORE_FT
PROCEDURES:  Revision total knee arthroplasty 10-Aug-2022 18:25:54  Syd Reynoso  Revision, patellar polyethylene implant 10-Aug-2022 18:26:09  Syd Reynoso

## 2022-08-11 ENCOUNTER — TRANSCRIPTION ENCOUNTER (OUTPATIENT)
Age: 62
End: 2022-08-11

## 2022-08-11 LAB
ANION GAP SERPL CALC-SCNC: 7 MMOL/L — SIGNIFICANT CHANGE UP (ref 5–17)
BUN SERPL-MCNC: 10 MG/DL — SIGNIFICANT CHANGE UP (ref 7–23)
CALCIUM SERPL-MCNC: 8.4 MG/DL — SIGNIFICANT CHANGE UP (ref 8.4–10.5)
CHLORIDE SERPL-SCNC: 106 MMOL/L — SIGNIFICANT CHANGE UP (ref 96–108)
CO2 SERPL-SCNC: 27 MMOL/L — SIGNIFICANT CHANGE UP (ref 22–31)
CREAT SERPL-MCNC: 0.8 MG/DL — SIGNIFICANT CHANGE UP (ref 0.5–1.3)
EGFR: 83 ML/MIN/1.73M2 — SIGNIFICANT CHANGE UP
GLUCOSE SERPL-MCNC: 105 MG/DL — HIGH (ref 70–99)
HCT VFR BLD CALC: 35.2 % — SIGNIFICANT CHANGE UP (ref 34.5–45)
HGB BLD-MCNC: 11.2 G/DL — LOW (ref 11.5–15.5)
MCHC RBC-ENTMCNC: 26.2 PG — LOW (ref 27–34)
MCHC RBC-ENTMCNC: 31.8 GM/DL — LOW (ref 32–36)
MCV RBC AUTO: 82.2 FL — SIGNIFICANT CHANGE UP (ref 80–100)
NRBC # BLD: 0 /100 WBCS — SIGNIFICANT CHANGE UP (ref 0–0)
PLATELET # BLD AUTO: 297 K/UL — SIGNIFICANT CHANGE UP (ref 150–400)
POTASSIUM SERPL-MCNC: 4.2 MMOL/L — SIGNIFICANT CHANGE UP (ref 3.5–5.3)
POTASSIUM SERPL-SCNC: 4.2 MMOL/L — SIGNIFICANT CHANGE UP (ref 3.5–5.3)
RBC # BLD: 4.28 M/UL — SIGNIFICANT CHANGE UP (ref 3.8–5.2)
RBC # FLD: 14.6 % — HIGH (ref 10.3–14.5)
SODIUM SERPL-SCNC: 140 MMOL/L — SIGNIFICANT CHANGE UP (ref 135–145)
WBC # BLD: 9.22 K/UL — SIGNIFICANT CHANGE UP (ref 3.8–10.5)
WBC # FLD AUTO: 9.22 K/UL — SIGNIFICANT CHANGE UP (ref 3.8–10.5)

## 2022-08-11 RX ORDER — SENNA PLUS 8.6 MG/1
2 TABLET ORAL
Qty: 0 | Refills: 0 | DISCHARGE
Start: 2022-08-11

## 2022-08-11 RX ORDER — ACETAMINOPHEN 500 MG
2 TABLET ORAL
Qty: 0 | Refills: 0 | DISCHARGE
Start: 2022-08-11

## 2022-08-11 RX ADMIN — CELECOXIB 200 MILLIGRAM(S): 200 CAPSULE ORAL at 06:17

## 2022-08-11 RX ADMIN — Medication 650 MILLIGRAM(S): at 01:20

## 2022-08-11 RX ADMIN — Medication 650 MILLIGRAM(S): at 13:00

## 2022-08-11 RX ADMIN — CELECOXIB 200 MILLIGRAM(S): 200 CAPSULE ORAL at 19:00

## 2022-08-11 RX ADMIN — Medication 325 MILLIGRAM(S): at 12:00

## 2022-08-11 RX ADMIN — Medication 1 TABLET(S): at 12:00

## 2022-08-11 RX ADMIN — CELECOXIB 200 MILLIGRAM(S): 200 CAPSULE ORAL at 18:00

## 2022-08-11 RX ADMIN — Medication 100 MILLIGRAM(S): at 00:19

## 2022-08-11 RX ADMIN — Medication 650 MILLIGRAM(S): at 00:20

## 2022-08-11 RX ADMIN — Medication 650 MILLIGRAM(S): at 19:00

## 2022-08-11 RX ADMIN — Medication 650 MILLIGRAM(S): at 07:17

## 2022-08-11 RX ADMIN — Medication 100 MILLIGRAM(S): at 06:17

## 2022-08-11 RX ADMIN — Medication 650 MILLIGRAM(S): at 18:00

## 2022-08-11 RX ADMIN — CELECOXIB 200 MILLIGRAM(S): 200 CAPSULE ORAL at 07:17

## 2022-08-11 RX ADMIN — Medication 650 MILLIGRAM(S): at 23:48

## 2022-08-11 RX ADMIN — Medication 650 MILLIGRAM(S): at 06:17

## 2022-08-11 RX ADMIN — Medication 650 MILLIGRAM(S): at 12:00

## 2022-08-11 NOTE — PROGRESS NOTE ADULT - SUBJECTIVE AND OBJECTIVE BOX
Orthopaedic Surgery Progress Note    Post-operative day #1 s/p right knee revision TKA    Subjective:     Patient seen and examined. Has some right knee pain .   Denies chest pain, shortness of breath, nausea/vomiting, numbness/tingling.    Objective:    Vital Signs Last 24 Hrs  T(C): 36.6 (08-11-22 @ 08:51), Max: 36.6 (08-11-22 @ 08:51)  T(F): 97.8 (08-11-22 @ 08:51), Max: 97.8 (08-11-22 @ 08:51)  HR: 67 (08-11-22 @ 08:51) (67 - 67)  BP: 124/77 (08-11-22 @ 08:51) (124/77 - 124/77)  BP(mean): --  RR: 16 (08-11-22 @ 08:51) (16 - 16)  SpO2: 96% (08-11-22 @ 08:51) (96% - 96%)  AVSS    PE:  General: Patient alert and oriented, NAD  Dressing: Clean/dry/intact right knee CAM  Sensation: intact to bilateral lower extremities   Motor: EHL/FHL/TA/GS 5/5 bilateral lower extremities                           11.2   9.22  )-----------( 297      ( 11 Aug 2022 05:30 )             35.2   08-11    140  |  106  |  10  ----------------------------<  105<H>  4.2   |  27  |  0.80    Ca    8.4      11 Aug 2022 05:30        A/P: 62yFemale POD#1 s/p revision right TKA   1. Pain control as needed  2. DVT prophylaxis: ASA   3. PT, weight-bearing status:  wbat, dispo for home PT vs. ANDREW pending progress

## 2022-08-11 NOTE — DISCHARGE NOTE PROVIDER - NSDCCPCAREPLAN_GEN_ALL_CORE_FT
PRINCIPAL DISCHARGE DIAGNOSIS  Diagnosis: Right knee pain  Assessment and Plan of Treatment:        PRINCIPAL DISCHARGE DIAGNOSIS  Diagnosis: Right knee pain  Assessment and Plan of Treatment: improvement s/p Right TKR

## 2022-08-11 NOTE — PROGRESS NOTE ADULT - SUBJECTIVE AND OBJECTIVE BOX
Ortho Note    Pt seen and examined on morning rounds. Pt comfortable without complaints, pain controlled.  Denies CP, SOB, N/V, numbness/tingling     Vital Signs Last 24 Hrs  T(C): 36.4 (08-10-22 @ 23:52), Max: 36.4 (08-10-22 @ 23:52)  T(F): 97.5 (08-10-22 @ 23:52), Max: 97.5 (08-10-22 @ 23:52)  HR: 62 (08-10-22 @ 23:52) (62 - 62)  BP: 131/79 (08-10-22 @ 23:52) (131/79 - 131/79)  BP(mean): --  RR: 18 (08-10-22 @ 23:52) (18 - 18)  SpO2: 97% (08-10-22 @ 23:52) (97% - 97%)  I&O's Summary    10 Aug 2022 07:01  -  11 Aug 2022 05:25  --------------------------------------------------------  IN: 1500 mL / OUT: 1700 mL / NET: -200 mL        Physical Exam:  General: Pt Alert and oriented, NAD  R knee with CAM dressing, C/d/i  Pulses: 2+ dp, pt pulses, wwp, cap refill <3 sec  Sensation: SILT in sural/saph/sp/dp/tibial distributions   Motor: EHL/FHL/TA/GS 5/5              A/P: 62yFemale POD#1 s/p R revision TKA, scar excision, patellar polyethylene exchange on 8/10  - Stable  - Pain Control  - f/u AM labs  - DVT ppx: ASA  - Post op abx: periop ancef  - PT, WBS: WBAT  - Dispo: home pending PT Clearance    Syd Reynoso, PGY-2  Ortho Pager [phone]

## 2022-08-11 NOTE — OCCUPATIONAL THERAPY INITIAL EVALUATION ADULT - RANGE OF MOTION EXAMINATION, LOWER EXTREMITY
R hip and ankle ROM WFL; R knee extended with limited flexion present ~30 degrees./Left LE Active ROM was WFL (within functional limits)

## 2022-08-11 NOTE — PHYSICAL THERAPY INITIAL EVALUATION ADULT - ADDITIONAL COMMENTS
Patient is a household ambulator who lives in a 1st floor apart with her son and daughter with ~8-9 MANUEL. Patient reports ambulating with SC and RW and states her son and daughter assists her with all ADLs. 4

## 2022-08-11 NOTE — DISCHARGE NOTE PROVIDER - NSDCHHNEEDSERVICE_GEN_ALL_CORE
Rehabilitation services/Teaching and training Medication teaching and assessment/Observation and assessment/Rehabilitation services/Teaching and training/Wound care and assessment

## 2022-08-11 NOTE — DISCHARGE NOTE PROVIDER - HOSPITAL COURSE
Admitted  Surgery RIGHT TKR REVISION   Trinh-op Antibiotics  Pain control  DVT prophylaxis  OOB/Physical Therapy Admitted: 8/10/22  Surgery: 8/10/22, RIGHT TKR REVISION   Trinh-op Antibiotics: Ancef  Pain control  DVT prophylaxis: SCDs, Aspirin 325mg daily  OOB/Physical Therapy

## 2022-08-11 NOTE — DISCHARGE NOTE PROVIDER - NSDCCPTREATMENT_GEN_ALL_CORE_FT
PRINCIPAL PROCEDURE  Procedure: Revision total knee arthroplasty  Findings and Treatment:       SECONDARY PROCEDURE  Procedure: Revision, patellar polyethylene implant  Findings and Treatment:

## 2022-08-11 NOTE — OCCUPATIONAL THERAPY INITIAL EVALUATION ADULT - MODALITIES TREATMENT COMMENTS
Pt tolerated sitting EOB ~10 mins while tolerated gentle ROM to R knee; however pt unable to flex R knee past 30 degrees

## 2022-08-11 NOTE — PHYSICAL THERAPY INITIAL EVALUATION ADULT - GROSSLY INTACT, SENSORY
Patient reports impaired sensation around L knee joint, stating she feels ~50%/Left LE/Grossly Intact

## 2022-08-11 NOTE — DISCHARGE NOTE PROVIDER - CARE PROVIDER_API CALL
Mil Shahid)  Orthopaedic Surgery  [address] 10th [place] [address]  Phone: [phone]  Fax: [phone]  Follow Up Time:    Mil Shahid)  Orthopaedic Surgery  [address], 10th [place] [address]  Phone: [phone]  Fax: [phone]  Follow Up Time: 2 weeks

## 2022-08-11 NOTE — DISCHARGE NOTE NURSING/CASE MANAGEMENT/SOCIAL WORK - NSDCPEFALRISK_GEN_ALL_CORE
For information on Fall & Injury Prevention, visit: [email or web address] OR  [email or web address] OR  https://www.cdc.gov/steadi/patient.html

## 2022-08-11 NOTE — DISCHARGE NOTE NURSING/CASE MANAGEMENT/SOCIAL WORK - PATIENT PORTAL LINK FT
You can access the FollowMyHealth Patient Portal offered by [facility] by registering at the following website: [email or web address]. By joining [facility]’s FollowMyHealth portal, you will also be able to view your health information using other applications (apps) compatible with our system.

## 2022-08-11 NOTE — DISCHARGE NOTE PROVIDER - NSDCFUADDINST_GEN_ALL_CORE_FT
No strenuous activity, heavy lifting, driving or returning to work until cleared by MD.    You have a CAM dressing. It is water resistant, but not waterproof. You may shower; however, the pump should be disconnected and placed in a safe location where it will not get wet.  No soaking in bathtubs.  The dressing has a battery that usually dies in 7 days. Once this occurs, you may remove the dressing and dispose of it, then leave incision open to air. Keep incision clean and dry.     Try to have regular bowel movements, take stool softener or laxative if necessary.  Swelling may travel all the way down extremity, this is normal and will subside in a few weeks.  Call to schedule an appt with Dr. Shahid for follow up, if you have staples or sutures they will be removed in office.  Contact your doctor if you experience: fever greater than 101.5, chills, chest pain, difficulty breathing, redness or excessive drainage around the incision, other concerns.  ACTIVITY:  - Weightbear as tolerated with assistive device. No strenuous activity, heavy lifting, driving or returning to work until cleared by MD.  - You may experience postoperative swelling on the operative extremity. You may ice the surgery site for 20 minute intervals.  - If you have had a knee replacement, do not place pillows or bolsters underneath the back of the knee.   - Continue to do your home exercises.    DRESSING: CAM  - You have a CAM dressing. It is water resistant, but not waterproof. You may shower; however, the pump should be disconnected and placed in a safe location where it will not get wet. No soaking in bathtubs. The dressing has a battery that usually dies in 7 days. Once this occurs, you may remove the dressing and dispose of it, then leave incision open to air. Keep incision clean and dry.    - You may shower, your dressing is water-resistant. Do not soak in bathtubs. Remove dressing after postop day 7, then leave incision open to air.  - Keep your incision clean and dry. Do not pick at your incision. Do not apply creams, ointments or oils to your incision until cleared by your surgeon. Do not soak your incision in sitting water (ie tubs, pools, lakes, etc.) until cleared by your surgeon. You may let clean, running water fall over your incision.    MEDICATION/ANTICOAGULATION:  -You have been prescribed Aspirin 325mg daily x 10 days postop as a preventative to help prevent postoperative blood clots. Please take this medication as prescribed.   - You have been prescribed medications for pain:    - Tylenol for mild to moderate pain. Do not exceed 3,000mg daily.    - For more severe pain, take Tylenol with the addition of narcotic pain medication. Take this medication as prescribed. This medication may cause drowsiness or dizziness. Do not operate machinery. This medication may cause constipation.  - For any additional medications, follow instructions on the bottle.   -Try to have regular bowel movements. Take stool softener or laxative if necessary. You may wish to take Miralax daily until you have regular bowel movements.   - If you have been prescribed Aspirin or an anti-inflammatory, please take Prilosec once a day, before breakfast, until no longer taking Aspirin or anti-inflammatory. This will help protect your stomach.  - If you have a pain management physician, please follow-up with them postoperatively.   - If you experience any negative side effects of your medications, please call your surgeon's office to discuss.    Follow-up:  - Call to schedule an appt with Dr. Shahid for follow up. If you have staples or sutures they will be removed in office.  - Please follow-up with your primary care physician or any other specialist you see postoperatively, if needed.   - Contact your doctor if you experience: fever greater than 101.5, chills, chest pain, difficulty breathing, redness or excessive drainage around the incision, other concerns.   Weight bear as tolerated with assistive device.  No strenuous activity, heavy lifting, driving or returning to work until cleared by MD.  You may shower - dressing is water-resistant, no soaking in bathtubs.  Remove dressing after post op day 7, then leave incision open to air. Keep incision clean and dry. No ointments or creams to incision until ok with surgeon.  Try to have regular bowel movements, take stool softener or laxative if necessary.  Swelling may travel all the way down leg to foot, this is normal and will subside in a few weeks.  Call to schedule an appt with Dr. Shahid for follow up, if you have staples or sutures they will be removed in office.  Contact your doctor if you experience: fever greater than 101.5, chills, chest pain, difficulty breathing, redness or excessive drainage around the incision, other concerns.  Follow up with your primary care provider.    Weight bear as tolerated with assistive device.  No strenuous activity, heavy lifting, driving or returning to work until cleared by MD.  You may shower - dressing is water-resistant, no soaking in bathtubs.  Dressing - you have an incisional wound vac dressing with attached battery pack. You may shower but must keep battery pack dry at all times. The battery dies in 7 days then can remove dressing and leave open to air. Soap and water ok, pat dry. Keep incision clean. No ointments or creams to incision until ok with surgeon.  Try to have regular bowel movements, take stool softener or laxative if necessary.  Swelling may travel all the way down leg to foot, this is normal and will subside in a few weeks.  Call to schedule an appt with Dr. Shahid for follow up, if you have staples or sutures they will be removed in office.  Contact your doctor if you experience: fever greater than 101.5, chills, chest pain, difficulty breathing, redness or excessive drainage around the incision, other concerns.  Follow up with your primary care provider.

## 2022-08-11 NOTE — OCCUPATIONAL THERAPY INITIAL EVALUATION ADULT - LIVES WITH, PROFILE
Pt lives with children in apt with 9-10 steps to enter. Pt reports she occasionally needs help with her LB dressing and shower transfers from daughter. Pt also reports she primarily uses straight cane however occasionally uses rolling walker. Pt has Jacuzzi tub and no shower chair./children

## 2022-08-11 NOTE — PHYSICAL THERAPY INITIAL EVALUATION ADULT - NEUROVASCULAR ASSESSMENT LLE
Patient reports impaired sensation around L knee joint, stating she feels ~50%/numbness present/warm/no tingling/no discoloration

## 2022-08-11 NOTE — PHYSICAL THERAPY INITIAL EVALUATION ADULT - GENERAL OBSERVATIONS, REHAB EVAL
PT IE completed. Patient received semi supine in bed +IV, +R knee dressing + cryocuff C/D/I, +L SCD, NAD, willing to work with PT

## 2022-08-11 NOTE — DISCHARGE NOTE PROVIDER - NSDCMRMEDTOKEN_GEN_ALL_CORE_FT
acetaminophen 325 mg oral tablet: 2 tab(s) orally every 6 hours  Multiple Vitamins oral tablet: 1 tab(s) orally once a day  oxyCODONE 5 mg oral tablet: 1-2 tab(s) orally every 4 hours, As Needed for moderate to severe pain MDD:6  senna leaf extract oral tablet: 2 tab(s) orally once a day (at bedtime)   aspirin 325 mg oral tablet: 1 tab(s) orally once a day for 10 days postop  celecoxib 200 mg oral capsule: 1 cap(s) orally 2 times a day   oxyCODONE 5 mg oral tablet: 1-2 tab(s) orally every 4 hours, As Needed for moderate to severe pain MDD:6  Tylenol Extra Strength 500 mg oral tablet: 2 tab(s) orally every 8 hours, As Needed -for mild pain MDD:4000mg

## 2022-08-11 NOTE — DISCHARGE NOTE PROVIDER - CARE PROVIDERS DIRECT ADDRESSES
[email or web address]

## 2022-08-11 NOTE — OCCUPATIONAL THERAPY INITIAL EVALUATION ADULT - DIAGNOSIS, OT EVAL
Pt p/w decrease R knee flexion (30 degrees) with increase swelling (medial >lateral) demonstrating decrease in balance and strength affecting ADLs and functional mobility.

## 2022-08-11 NOTE — PHYSICAL THERAPY INITIAL EVALUATION ADULT - BED MOBILITY LIMITATIONS, REHAB EVAL
Benefits from PT assist for RLE advancement during bed mobility/decreased ability to use legs for bridging/pushing

## 2022-08-11 NOTE — PHYSICAL THERAPY INITIAL EVALUATION ADULT - TRANSFER SAFETY CONCERNS NOTED: SIT/STAND, REHAB EVAL
to be assessed. Patient with impaired R knee ROM and increased pain while seated EOB. PT deferred transfer training at this time.

## 2022-08-12 VITALS
OXYGEN SATURATION: 95 % | TEMPERATURE: 98 F | DIASTOLIC BLOOD PRESSURE: 73 MMHG | RESPIRATION RATE: 17 BRPM | HEART RATE: 64 BPM | SYSTOLIC BLOOD PRESSURE: 115 MMHG

## 2022-08-12 LAB
ANION GAP SERPL CALC-SCNC: 5 MMOL/L — SIGNIFICANT CHANGE UP (ref 5–17)
BUN SERPL-MCNC: 13 MG/DL — SIGNIFICANT CHANGE UP (ref 7–23)
CALCIUM SERPL-MCNC: 8.5 MG/DL — SIGNIFICANT CHANGE UP (ref 8.4–10.5)
CHLORIDE SERPL-SCNC: 107 MMOL/L — SIGNIFICANT CHANGE UP (ref 96–108)
CO2 SERPL-SCNC: 26 MMOL/L — SIGNIFICANT CHANGE UP (ref 22–31)
CREAT SERPL-MCNC: 0.9 MG/DL — SIGNIFICANT CHANGE UP (ref 0.5–1.3)
EGFR: 72 ML/MIN/1.73M2 — SIGNIFICANT CHANGE UP
GLUCOSE SERPL-MCNC: 106 MG/DL — HIGH (ref 70–99)
HCT VFR BLD CALC: 34.9 % — SIGNIFICANT CHANGE UP (ref 34.5–45)
HGB BLD-MCNC: 11.2 G/DL — LOW (ref 11.5–15.5)
MCHC RBC-ENTMCNC: 26.4 PG — LOW (ref 27–34)
MCHC RBC-ENTMCNC: 32.1 GM/DL — SIGNIFICANT CHANGE UP (ref 32–36)
MCV RBC AUTO: 82.1 FL — SIGNIFICANT CHANGE UP (ref 80–100)
NRBC # BLD: 0 /100 WBCS — SIGNIFICANT CHANGE UP (ref 0–0)
PLATELET # BLD AUTO: 285 K/UL — SIGNIFICANT CHANGE UP (ref 150–400)
POTASSIUM SERPL-MCNC: 4.2 MMOL/L — SIGNIFICANT CHANGE UP (ref 3.5–5.3)
POTASSIUM SERPL-SCNC: 4.2 MMOL/L — SIGNIFICANT CHANGE UP (ref 3.5–5.3)
RBC # BLD: 4.25 M/UL — SIGNIFICANT CHANGE UP (ref 3.8–5.2)
RBC # FLD: 14.5 % — SIGNIFICANT CHANGE UP (ref 10.3–14.5)
SODIUM SERPL-SCNC: 138 MMOL/L — SIGNIFICANT CHANGE UP (ref 135–145)
WBC # BLD: 8.25 K/UL — SIGNIFICANT CHANGE UP (ref 3.8–10.5)
WBC # FLD AUTO: 8.25 K/UL — SIGNIFICANT CHANGE UP (ref 3.8–10.5)

## 2022-08-12 PROCEDURE — 97161 PT EVAL LOW COMPLEX 20 MIN: CPT

## 2022-08-12 PROCEDURE — 85027 COMPLETE CBC AUTOMATED: CPT

## 2022-08-12 PROCEDURE — 97110 THERAPEUTIC EXERCISES: CPT

## 2022-08-12 PROCEDURE — 36415 COLL VENOUS BLD VENIPUNCTURE: CPT

## 2022-08-12 PROCEDURE — C1776: CPT

## 2022-08-12 PROCEDURE — 73560 X-RAY EXAM OF KNEE 1 OR 2: CPT

## 2022-08-12 PROCEDURE — 80048 BASIC METABOLIC PNL TOTAL CA: CPT

## 2022-08-12 PROCEDURE — C1713: CPT

## 2022-08-12 PROCEDURE — 97116 GAIT TRAINING THERAPY: CPT

## 2022-08-12 RX ORDER — ASPIRIN/CALCIUM CARB/MAGNESIUM 324 MG
1 TABLET ORAL
Qty: 10 | Refills: 0
Start: 2022-08-12 | End: 2022-08-21

## 2022-08-12 RX ORDER — ACETAMINOPHEN 500 MG
2 TABLET ORAL
Qty: 100 | Refills: 0
Start: 2022-08-12

## 2022-08-12 RX ORDER — CELECOXIB 200 MG/1
1 CAPSULE ORAL
Qty: 28 | Refills: 0
Start: 2022-08-12 | End: 2022-08-25

## 2022-08-12 RX ORDER — OXYCODONE HYDROCHLORIDE 5 MG/1
1 TABLET ORAL
Qty: 42 | Refills: 0
Start: 2022-08-12 | End: 2022-08-18

## 2022-08-12 RX ADMIN — Medication 650 MILLIGRAM(S): at 11:12

## 2022-08-12 RX ADMIN — Medication 650 MILLIGRAM(S): at 12:31

## 2022-08-12 RX ADMIN — Medication 650 MILLIGRAM(S): at 00:48

## 2022-08-12 RX ADMIN — Medication 325 MILLIGRAM(S): at 11:16

## 2022-08-12 RX ADMIN — CELECOXIB 200 MILLIGRAM(S): 200 CAPSULE ORAL at 05:26

## 2022-08-12 RX ADMIN — Medication 650 MILLIGRAM(S): at 05:26

## 2022-08-12 RX ADMIN — Medication 1 TABLET(S): at 11:11

## 2022-08-12 NOTE — PROGRESS NOTE ADULT - SUBJECTIVE AND OBJECTIVE BOX
Ortho Note    Pt seen and examined on morning rounds. Pt comfortable without complaints, pain controlled.  Denies CP, SOB, N/V, numbness/tingling     Vital Signs Last 24 Hrs  T(C): 36.3 (08-12-22 @ 05:20), Max: 36.3 (08-12-22 @ 05:20)  T(F): 97.3 (08-12-22 @ 05:20), Max: 97.3 (08-12-22 @ 05:20)  HR: 67 (08-12-22 @ 05:20) (67 - 67)  BP: 109/69 (08-12-22 @ 05:20) (109/69 - 109/69)  BP(mean): 83 (08-12-22 @ 05:20) (83 - 83)  RR: 17 (08-12-22 @ 05:20) (17 - 17)  SpO2: 95% (08-12-22 @ 05:20) (95% - 95%)  I&O's Summary    10 Aug 2022 07:01  -  11 Aug 2022 07:00  --------------------------------------------------------  IN: 1500 mL / OUT: 2350 mL / NET: -850 mL    11 Aug 2022 07:01  -  12 Aug 2022 06:20  --------------------------------------------------------  IN: 1940 mL / OUT: 1150 mL / NET: 790 mL        Physical Exam:  General: Pt Alert and oriented, NAD  R knee with CAM dressing, C/d/i wjth cryocuff  Pulses: 2+ dp, pt pulses, wwp, cap refill <3 sec  Sensation: SILT in sural/saph/sp/dp/tibial distributions   Motor: EHL/FHL/TA/GS 5/5                          11.2   9.22  )-----------( 297      ( 11 Aug 2022 05:30 )             35.2     08-11    140  |  106  |  10  ----------------------------<  105<H>  4.2   |  27  |  0.80    Ca    8.4      11 Aug 2022 05:30        A/P: 62yFemale POD#2 s/p R revision TKA, scar excision, patellar polyethylene exchange on 8/10  - Stable  - Pain Control  - f/u AM labs  - DVT ppx: ASA  - Post op abx: periop ancef  - PT, WBS: WBAT  - Dispo: home pending PT Clearance    Syd Reynoso, PGY-2  Ortho Pager [phone]

## 2022-08-19 DIAGNOSIS — M17.11 UNILATERAL PRIMARY OSTEOARTHRITIS, RIGHT KNEE: ICD-10-CM

## 2022-08-19 DIAGNOSIS — Z96.651 PRESENCE OF RIGHT ARTIFICIAL KNEE JOINT: ICD-10-CM

## 2022-08-19 DIAGNOSIS — I10 ESSENTIAL (PRIMARY) HYPERTENSION: ICD-10-CM

## 2022-08-19 DIAGNOSIS — F17.210 NICOTINE DEPENDENCE, CIGARETTES, UNCOMPLICATED: ICD-10-CM

## 2022-08-19 DIAGNOSIS — Z79.82 LONG TERM (CURRENT) USE OF ASPIRIN: ICD-10-CM
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
## 2022-08-19 DIAGNOSIS — M25.661 STIFFNESS OF RIGHT KNEE, NOT ELSEWHERE CLASSIFIED: ICD-10-CM

## 2022-08-19 DIAGNOSIS — T84.092A OTHER MECHANICAL COMPLICATION OF INTERNAL RIGHT KNEE PROSTHESIS, INITIAL ENCOUNTER: ICD-10-CM

## 2022-08-25 PROBLEM — M25.561 PAIN IN RIGHT KNEE: Chronic | Status: ACTIVE | Noted: 2022-08-09

## 2022-08-30 ENCOUNTER — APPOINTMENT (OUTPATIENT)
Dept: ORTHOPEDIC SURGERY | Facility: CLINIC | Age: 62
End: 2022-08-30

## 2022-08-30 PROCEDURE — 99024 POSTOP FOLLOW-UP VISIT: CPT

## 2022-08-30 NOTE — HISTORY OF PRESENT ILLNESS
[de-identified] : First postoperative visit for this patient underwent right knee revision surgery.  Patient had significant difficulty with flexion despite manipulations.  Her surgery was fairly straightforward she achieved at least 95 to 110 degrees of gravity assisted flexion at the end of the case.

## 2022-08-30 NOTE — REASON FOR VISIT
[Post Operative Visit] : a post operative visit for [Joint Replacement Surgery, Aftercare] : joint replacement surgery, aftercare [FreeTextEntry2] : 1st POA visit 08/10/22 Rt knee replacement. Pt states she is unable to bend the knee. she is also feeling an isolated pain on the inner Lt side of the knee

## 2022-08-30 NOTE — PHYSICAL EXAM
[de-identified] : Patient's wound is well-healed range of motion is 0- 75 degrees. [de-identified] : No x-rays today due to lack of an x-ray tech

## 2022-08-30 NOTE — DISCUSSION/SUMMARY
[de-identified] : Patient will be sent for formal physical therapy we would like to see some improvement with her terminal flexion that was gained during the surgery.  She will follow-up in 1 month's time

## 2022-08-31 ENCOUNTER — FORM ENCOUNTER (OUTPATIENT)
Age: 62
End: 2022-08-31

## 2022-09-14 ENCOUNTER — FORM ENCOUNTER (OUTPATIENT)
Age: 62
End: 2022-09-14

## 2022-09-27 ENCOUNTER — APPOINTMENT (OUTPATIENT)
Dept: ORTHOPEDIC SURGERY | Facility: CLINIC | Age: 62
End: 2022-09-27

## 2022-09-27 PROCEDURE — 99214 OFFICE O/P EST MOD 30 MIN: CPT | Mod: 24

## 2022-09-27 PROCEDURE — 99072 ADDL SUPL MATRL&STAF TM PHE: CPT

## 2022-09-27 PROCEDURE — 73562 X-RAY EXAM OF KNEE 3: CPT | Mod: RT

## 2022-09-27 NOTE — PHYSICAL EXAM
[de-identified] : Range of motion today 0 to 55 degrees.  Any attempt beyond that causes significant discomfort.  There is some mild warmth about the knee but no effusion no erythema. [de-identified] : Knee x-rays were ordered today.  AP standing individual lateral radiographs were obtained and compared to previous x-rays postoperatively showing no change in component position migration wear or other abnormality no evidence of any H0 formation.

## 2022-09-27 NOTE — REASON FOR VISIT
[Follow-Up Visit] : a follow-up visit for [Total Knee Replacement Surgery, Aftercare] : total knee replacement surgery, aftercare [FreeTextEntry2] : Rt knee replacement f/u. Pt states she is still unable to bend the knee, the knee is hot to the touch and she also gets pain that radiates to the hip at night.

## 2022-09-27 NOTE — DISCUSSION/SUMMARY
[de-identified] : Very difficult situation with patient today.  We talked at length about potentially considering another manipulation under anesthesia however any attempt at further surgical intervention I do not believe I can guarantee her she will not become stiff again.  The underlying cause of her stiffness is under clear we did have preoperative CT scan of the femoral and tibial components and found them to be in good position and rotation.  Patient will continue with physical therapy in a month at that point she will return we may consider manipulation under anesthesia at that point.

## 2022-09-27 NOTE — HISTORY OF PRESENT ILLNESS
[de-identified] : Patient returns today she is here with residual stiffness once again.  This is quite unfortunate patient intraoperatively had achieved motion of 0-1 105/110 degrees.  She again has developed significant stiffness any attempted motion beyond her current range is quite painful.

## 2022-10-27 ENCOUNTER — APPOINTMENT (OUTPATIENT)
Dept: ORTHOPEDIC SURGERY | Facility: CLINIC | Age: 62
End: 2022-10-27

## 2022-10-27 PROCEDURE — 99024 POSTOP FOLLOW-UP VISIT: CPT

## 2022-10-27 NOTE — PHYSICAL EXAM
[de-identified] : Patient's range of motion today right knee is 0 to 85 degrees.  She is neurovascular intact.

## 2022-10-27 NOTE — DISCUSSION/SUMMARY
[de-identified] : Patient is frustrated with her lack of terminal flexion however she is showing a 30 degree improvement in terminal flexion from her initial postoperative visit.  I discussed with the patient I believe we need some more time with continued physical therapy to continue improve her terminal flexion.  Patient will return in 4 to 6 weeks at that point we will reassess her we also discussed today the potential for a remanipulation.

## 2022-10-27 NOTE — HISTORY OF PRESENT ILLNESS
[de-identified] : Patient returns today this is her second postop visit since her August 10 scar excision and patellar revision for stiffness.  Patient states she continues with physical therapy but is discouraged by her lack of terminal flexion.  Patient ambulates with a cane.  She continues with physical therapy twice a week.

## 2022-10-27 NOTE — REASON FOR VISIT
[Follow-Up Visit] : a follow-up visit for [Artificial Knee Joint] : an artificial knee joint [Other: ____] : [unfilled] [FreeTextEntry2] : Pt states the Rt knee replacement is progressing very slowly. PT is helping but not much improvement she is unable to bend the knee and she is also experiencing lumbar pain on the left side which is preventing her from sleeping.

## 2022-12-01 ENCOUNTER — APPOINTMENT (OUTPATIENT)
Dept: ORTHOPEDIC SURGERY | Facility: CLINIC | Age: 62
End: 2022-12-01

## 2022-12-01 DIAGNOSIS — M17.12 UNILATERAL PRIMARY OSTEOARTHRITIS, LEFT KNEE: ICD-10-CM

## 2022-12-01 PROCEDURE — 99072 ADDL SUPL MATRL&STAF TM PHE: CPT

## 2022-12-01 PROCEDURE — 99214 OFFICE O/P EST MOD 30 MIN: CPT

## 2022-12-06 NOTE — REASON FOR VISIT
[Follow-Up Visit] : a follow-up visit for [Workers' Comp: Date of Injury: _____] : This visit is related to worker's compensation. Date of Injury: [unfilled] [Knee Pain] : knee pain [Artificial Knee Joint] : an artificial knee joint [FreeTextEntry2] : Rt knee pain. Pt states she is in a lot of pain. WC case

## 2022-12-06 NOTE — PHYSICAL EXAM
[de-identified] : Range of motion is 0 to 85 degrees.  Not improved in the last visit neurovascular intact distally.  Quad tone is intact.

## 2022-12-06 NOTE — DISCUSSION/SUMMARY
[de-identified] : Patient I had a long discussion I believe her biggest issue leading to her dissatisfaction with the surgery is the lack of terminal flexion.  Patient had achieved significant improvement at her revision surgery but again has regressed to 85 degrees of flexion.  We talked about her options at this point patient already has had CT evaluation of the components position a were deemed to be appropriate.  I am hesitant to recommend any further surgery due to the fact that patient has returned to her preoperative range of motion.  We will consider a manipulation under anesthesia due to his low more bili and potential improvement although I cannot guarantee the patient any significant sustained improvement.  Patient will consider this option we will accommodate her if she decides to proceed.\par \par patient is 100 % disabled and cannot return back to work. Temporary total disability

## 2022-12-06 NOTE — HISTORY OF PRESENT ILLNESS
[de-identified] : Patient returns today she has not had any significant improvement in her terminal flexion of the right knee.  Recall patient is status post revision surgery where the patient underwent a large synovectomy and scar excision as well as patellar revision.  Patient preoperatively had a CT scan indicating appropriate component position and it was noted during the surgical procedure patient had significant improvement in her range of motion with terminal flexion especially.\par \par Patient apparently has seen another orthopedic surgeon for second opinion and by the patient's report the patient was told that she had a issue with her component position potentially.  I discussed at length with the patient that this avenue of investigation for her stiffness is already been completed.

## 2023-01-12 ENCOUNTER — APPOINTMENT (OUTPATIENT)
Dept: ORTHOPEDIC SURGERY | Facility: CLINIC | Age: 63
End: 2023-01-12
Payer: OTHER MISCELLANEOUS

## 2023-01-12 ENCOUNTER — FORM ENCOUNTER (OUTPATIENT)
Age: 63
End: 2023-01-12

## 2023-01-12 PROCEDURE — 99072 ADDL SUPL MATRL&STAF TM PHE: CPT

## 2023-01-12 PROCEDURE — 99215 OFFICE O/P EST HI 40 MIN: CPT

## 2023-01-12 NOTE — PHYSICAL EXAM
[de-identified] : Patient's exam today she has full extension patient has flexion to about 45 degrees.  There is mild warmth about the knee just some swelling no obvious effusion.  Quad tone is good she is neurovascular intact distally.

## 2023-01-12 NOTE — HISTORY OF PRESENT ILLNESS
[de-identified] : Patient returns today she is complaining of pain with prolonged standing she also notices swelling in the right knee.  Patient states that she is losing the flexibility of the right knee despite the fact that with the previous revision surgery for scar excision was able to flex to 90 degrees.  She is here for evaluation and to adopt a new treatment protocol.

## 2023-01-12 NOTE — DISCUSSION/SUMMARY
[de-identified] : Patient will return in a week for evaluation of radiographs that she will be doing later today.  We will place her on a Medrol dose pack to help reduce some of her discomfort.  The reason risk benefits of medication were discussed in detail including potential side effects.  Patient will follow-up and we will review reviewed the radiographs together.  We will try to formulate a plan based on the radiographs we may also be repeating a CT scan.\par \par Today's consultation lasted 40 minutes.

## 2023-01-12 NOTE — REASON FOR VISIT
[Follow-Up Visit] : a follow-up visit for [Artificial Knee Joint] : an artificial knee joint [FreeTextEntry2] : follow up.

## 2023-01-17 ENCOUNTER — FORM ENCOUNTER (OUTPATIENT)
Age: 63
End: 2023-01-17

## 2023-02-02 ENCOUNTER — APPOINTMENT (OUTPATIENT)
Dept: ORTHOPEDIC SURGERY | Facility: CLINIC | Age: 63
End: 2023-02-02
Payer: OTHER MISCELLANEOUS

## 2023-02-02 PROCEDURE — 99072 ADDL SUPL MATRL&STAF TM PHE: CPT

## 2023-02-02 PROCEDURE — 99215 OFFICE O/P EST HI 40 MIN: CPT

## 2023-02-02 NOTE — HISTORY OF PRESENT ILLNESS
[de-identified] : Patient returns today she continues to complain of stiffness and pain in the knee.  Because she is status post knee replacement surgery thereafter she underwent an open synovectomy with patellar revision.  We reviewed the intraoperative report and noted the patient was freely flexing to 110 to 115 degrees during that procedure.  However in the interim time patient has become stiff again she was sent for recent CT scan results are available for review upon direct visualization of the tibial component on the CT scan it appears to be in a neutral rotation.  I reviewed the report of the radiologist who stated 45 degrees of internal rotation and find that to be incorrect based on my personal review of the images.  The femoral component also appears neutral again contrasting with the radiologist interpretation.

## 2023-02-02 NOTE — DISCUSSION/SUMMARY
[de-identified] : I long conversation with the patient today.  She has a fair amount of pain she is still relatively young 62 years old otherwise healthy.  She needs to ambulate with pain and states she rarely leaves the house because of her inability to flex the right knee.\par \par I reviewed the radiographs with the patient today as well as the previous operative report I stated that we really have just 2 choices at this point wants to except this limitation with out expectation that it should improve with any major significance.  The other option would be to have a full revision of all components and a significant synovectomy done openly.  However I advised the patient that we were able to afford her significant improvement in her flexion of the last surgery and she became stiff again.  And I cannot guarantee her any improvement over the results of rehab at this point.  I suggested the patient consider these options seriously that she should take a month to consider these options and addressed with her also the potential complications of repeat revision surgery such as infection extensor mechanism disruption continued pain and continued stiffness.  Patient will consider these options follow-up in 1 month's time.\par \par Today's consultation lasted 45 minutes.

## 2023-02-02 NOTE — PHYSICAL EXAM
[de-identified] : Patient's exam today she has full extension patient has flexion to about 45 degrees.  There is mild warmth about the knee just some swelling no obvious effusion.  Quad tone is good she is neurovascular intact distally.

## 2023-02-02 NOTE — REASON FOR VISIT
[Follow-Up Visit] : a follow-up visit for [Artificial Knee Joint] : an artificial knee joint [FreeTextEntry2] : Follow up

## 2023-03-02 ENCOUNTER — APPOINTMENT (OUTPATIENT)
Dept: ORTHOPEDIC SURGERY | Facility: CLINIC | Age: 63
End: 2023-03-02

## 2023-03-09 ENCOUNTER — APPOINTMENT (OUTPATIENT)
Dept: ORTHOPEDIC SURGERY | Facility: CLINIC | Age: 63
End: 2023-03-09
Payer: OTHER MISCELLANEOUS

## 2023-03-09 PROCEDURE — 99215 OFFICE O/P EST HI 40 MIN: CPT

## 2023-03-09 PROCEDURE — 99072 ADDL SUPL MATRL&STAF TM PHE: CPT

## 2023-03-09 NOTE — REASON FOR VISIT
[Follow-Up Visit] : a follow-up visit for [Artificial Knee Joint] : an artificial knee joint [Total Knee Replacement Surgery, Aftercare] : total knee replacement surgery, aftercare [FreeTextEntry2] : Rt Knee replaced. Pt is unable to bend the knee.

## 2023-03-09 NOTE — PHYSICAL EXAM
[de-identified] : Patient's exam today she has full extension patient has flexion to about 45 degrees.  There is mild warmth about the knee just some swelling no obvious effusion.  Quad tone is good she is neurovascular intact distally.

## 2023-03-09 NOTE — DISCUSSION/SUMMARY
[de-identified] : Patient with past has been told to consider very seriously the potential negative effects of the repeat operation.  First and foremost would be inability to achieve any improvement in her flexion despite intraoperative findings of improved flexion due to resumption of stiffness.  Patient also understands there are the reasonable risk and benefits of infection instability and need for reoperation after further surgery.  Patient has digestible that she would like to move forward with the open synovectomy and probable full revision of the components.  Patient accepts the possibility that she will once again revert to this level of stiffness.  Recall patient had radiographs as well as recent CT scan which showed proper component position.\par \par We reviewed typical convalescence expectations expectations for implant longevity.\par \par Surgical risks reviewed. The reasonable risks and benefits of knee replacement surgery discussed in detail with the patient. Patient asked appropriate questions which were answered to their satisfaction. We talked about potential complications including complications they may require revision surgery such as component loosening failure migration wear and also potential complications of infection and stiffness.\par \par \par \par Today's consultation lasted 50 minutes

## 2023-03-09 NOTE — HISTORY OF PRESENT ILLNESS
[de-identified] : Patient I are here to discuss her options she has had multiple right knee surgeries and now has is in a situation where she is limited range of motion and pain.  In summary patient initially had arthroscopic surgery followed by knee replacement surgery followed by manipulation and by skin open scar excision.  Currently she is its that she has to ambulate with a cane she has pain on daily basis and finds activities of daily living difficult because of decreased mobility.  Patient and I reviewed the previous operative reports at the time of her scar excision which was open patient had achieved range of motion of 0-1 120/25 degrees of flexion.  Patient had a chronic problem having physical therapy authorized in the immediate postop period needing to stiffness.

## 2023-06-06 ENCOUNTER — TRANSCRIPTION ENCOUNTER (OUTPATIENT)
Age: 63
End: 2023-06-06

## 2023-06-06 VITALS
RESPIRATION RATE: 16 BRPM | OXYGEN SATURATION: 97 % | DIASTOLIC BLOOD PRESSURE: 89 MMHG | SYSTOLIC BLOOD PRESSURE: 172 MMHG | HEIGHT: 62 IN | TEMPERATURE: 98 F | HEART RATE: 83 BPM | WEIGHT: 175.05 LBS

## 2023-06-06 RX ORDER — OXYCODONE HYDROCHLORIDE 5 MG/1
10 TABLET ORAL ONCE
Refills: 0 | Status: DISCONTINUED | OUTPATIENT
Start: 2023-06-07 | End: 2023-06-07

## 2023-06-06 RX ORDER — CHLORHEXIDINE GLUCONATE 213 G/1000ML
1 SOLUTION TOPICAL ONCE
Refills: 0 | Status: COMPLETED | OUTPATIENT
Start: 2023-06-07 | End: 2023-06-07

## 2023-06-06 RX ORDER — POVIDONE-IODINE 5 %
1 AEROSOL (ML) TOPICAL ONCE
Refills: 0 | Status: COMPLETED | OUTPATIENT
Start: 2023-06-07 | End: 2023-06-07

## 2023-06-06 RX ORDER — CELECOXIB 200 MG/1
400 CAPSULE ORAL ONCE
Refills: 0 | Status: COMPLETED | OUTPATIENT
Start: 2023-06-07 | End: 2023-06-07

## 2023-06-06 NOTE — ASU PATIENT PROFILE, ADULT - NSICDXPASTSURGICALHX_GEN_ALL_CORE_FT
PAST SURGICAL HISTORY:  H/O arthroscopy of right knee     S/P total knee replacement, right 11/21

## 2023-06-06 NOTE — H&P ADULT - PROBLEM SELECTOR PLAN 1
Admit to Orthopaedic Service.  Presents today for elective Right Revision TKR  Pt medically stable and cleared for procedure today by  ____ Admit to Orthopaedic Service.  Presents today for elective Right Revision TKR  Pt medically stable and cleared for procedure today by Dr. Thao

## 2023-06-06 NOTE — H&P ADULT - NSHPLABSRESULTS_GEN_ALL_CORE
Labs:   EKG:  CXR:   3M DOS preop cbc/bmp/coags/ua wnl per medical clearance   Cr 1.08  Preop EKG wnl per clearance   3M DOS

## 2023-06-06 NOTE — H&P ADULT - NSHPPHYSICALEXAM_GEN_ALL_CORE
Gen: Alert and oriented, NAD  MSK: Decreased ROM to right knee 2/2 pain     Rest of PE per medical clearance note Gen: Alert and oriented, NAD  MSK:. skin intact, midline incision intact, SLT INTACT (decreased on lateral right knee region), DP/PT 2+, EHL/TA/GS 5/5  Decreased ROM to right knee 2/2 pain     Rest of PE per medical clearance note

## 2023-06-06 NOTE — ASU PATIENT PROFILE, ADULT - FALL HARM RISK - HARM RISK INTERVENTIONS

## 2023-06-06 NOTE — H&P ADULT - HISTORY OF PRESENT ILLNESS
61yo female w/ right knee pain x    Presents today for elective Right Revision TKR w/ Dr. Shahid 61yo female w/ right knee pain. Pt. states she had right knee arthroscopy in 2021, right tkr 2021, MASOOD 3/22, revision right tkr 8/22 in which polyexchange happened. Pt. c/o pain and swelling with walking. Pt. recently did steroid injections and PT which did not help with pain relief. Pt. uses cane assistance.     Presents today for elective Right Revision TKR w/ Dr. Shahid

## 2023-06-07 ENCOUNTER — RESULT REVIEW (OUTPATIENT)
Age: 63
End: 2023-06-07

## 2023-06-07 ENCOUNTER — INPATIENT (INPATIENT)
Facility: HOSPITAL | Age: 63
LOS: 1 days | Discharge: HOME | DRG: 468 | End: 2023-06-09
Attending: SPECIALIST | Admitting: SPECIALIST
Payer: OTHER MISCELLANEOUS

## 2023-06-07 ENCOUNTER — APPOINTMENT (OUTPATIENT)
Dept: ORTHOPEDIC SURGERY | Facility: HOSPITAL | Age: 63
End: 2023-06-07
Payer: OTHER MISCELLANEOUS

## 2023-06-07 DIAGNOSIS — Z98.890 OTHER SPECIFIED POSTPROCEDURAL STATES: Chronic | ICD-10-CM

## 2023-06-07 DIAGNOSIS — M17.11 UNILATERAL PRIMARY OSTEOARTHRITIS, RIGHT KNEE: ICD-10-CM

## 2023-06-07 DIAGNOSIS — Z96.651 PRESENCE OF RIGHT ARTIFICIAL KNEE JOINT: Chronic | ICD-10-CM

## 2023-06-07 PROCEDURE — 73560 X-RAY EXAM OF KNEE 1 OR 2: CPT | Mod: 26,RT

## 2023-06-07 PROCEDURE — 27487 REVISE/REPLACE KNEE JOINT: CPT | Mod: AS,RT

## 2023-06-07 PROCEDURE — 27487 REVISE/REPLACE KNEE JOINT: CPT | Mod: RT

## 2023-06-07 DEVICE — IMPLANTABLE DEVICE: Type: IMPLANTABLE DEVICE | Site: RIGHT | Status: FUNCTIONAL

## 2023-06-07 DEVICE — PIN TROCAR GEN 1/8 X 3IN: Type: IMPLANTABLE DEVICE | Site: RIGHT | Status: FUNCTIONAL

## 2023-06-07 RX ORDER — CELECOXIB 200 MG/1
200 CAPSULE ORAL EVERY 12 HOURS
Refills: 0 | Status: DISCONTINUED | OUTPATIENT
Start: 2023-06-08 | End: 2023-06-08

## 2023-06-07 RX ORDER — ONDANSETRON 8 MG/1
4 TABLET, FILM COATED ORAL EVERY 6 HOURS
Refills: 0 | Status: DISCONTINUED | OUTPATIENT
Start: 2023-06-07 | End: 2023-06-09

## 2023-06-07 RX ORDER — OXYCODONE HYDROCHLORIDE 5 MG/1
5 TABLET ORAL
Refills: 0 | Status: DISCONTINUED | OUTPATIENT
Start: 2023-06-07 | End: 2023-06-08

## 2023-06-07 RX ORDER — HYDROMORPHONE HYDROCHLORIDE 2 MG/ML
0.5 INJECTION INTRAMUSCULAR; INTRAVENOUS; SUBCUTANEOUS ONCE
Refills: 0 | Status: COMPLETED | OUTPATIENT
Start: 2023-06-07

## 2023-06-07 RX ORDER — MAGNESIUM HYDROXIDE 400 MG/1
30 TABLET, CHEWABLE ORAL DAILY
Refills: 0 | Status: DISCONTINUED | OUTPATIENT
Start: 2023-06-07 | End: 2023-06-09

## 2023-06-07 RX ORDER — SODIUM CHLORIDE 9 MG/ML
1000 INJECTION, SOLUTION INTRAVENOUS
Refills: 0 | Status: DISCONTINUED | OUTPATIENT
Start: 2023-06-08 | End: 2023-06-09

## 2023-06-07 RX ORDER — SENNA PLUS 8.6 MG/1
2 TABLET ORAL AT BEDTIME
Refills: 0 | Status: DISCONTINUED | OUTPATIENT
Start: 2023-06-07 | End: 2023-06-09

## 2023-06-07 RX ORDER — ACETAMINOPHEN 500 MG
650 TABLET ORAL EVERY 6 HOURS
Refills: 0 | Status: DISCONTINUED | OUTPATIENT
Start: 2023-06-07 | End: 2023-06-08

## 2023-06-07 RX ORDER — CEFAZOLIN SODIUM 1 G
2000 VIAL (EA) INJECTION EVERY 8 HOURS
Refills: 0 | Status: COMPLETED | OUTPATIENT
Start: 2023-06-07 | End: 2023-06-08

## 2023-06-07 RX ORDER — ASPIRIN/CALCIUM CARB/MAGNESIUM 324 MG
325 TABLET ORAL DAILY
Refills: 0 | Status: DISCONTINUED | OUTPATIENT
Start: 2023-06-07 | End: 2023-06-09

## 2023-06-07 RX ORDER — POLYETHYLENE GLYCOL 3350 17 G/17G
17 POWDER, FOR SOLUTION ORAL AT BEDTIME
Refills: 0 | Status: DISCONTINUED | OUTPATIENT
Start: 2023-06-07 | End: 2023-06-09

## 2023-06-07 RX ADMIN — OXYCODONE HYDROCHLORIDE 10 MILLIGRAM(S): 5 TABLET ORAL at 13:59

## 2023-06-07 RX ADMIN — Medication 1 APPLICATION(S): at 14:29

## 2023-06-07 RX ADMIN — CELECOXIB 400 MILLIGRAM(S): 200 CAPSULE ORAL at 13:58

## 2023-06-07 RX ADMIN — CHLORHEXIDINE GLUCONATE 1 APPLICATION(S): 213 SOLUTION TOPICAL at 14:28

## 2023-06-07 RX ADMIN — Medication 100 MILLIGRAM(S): at 22:07

## 2023-06-07 NOTE — PATIENT PROFILE ADULT - FALL HARM RISK - HARM RISK INTERVENTIONS

## 2023-06-08 ENCOUNTER — TRANSCRIPTION ENCOUNTER (OUTPATIENT)
Age: 63
End: 2023-06-08

## 2023-06-08 LAB
ANION GAP SERPL CALC-SCNC: 10 MMOL/L — SIGNIFICANT CHANGE UP (ref 5–17)
BUN SERPL-MCNC: 18 MG/DL — SIGNIFICANT CHANGE UP (ref 7–23)
CALCIUM SERPL-MCNC: 8.6 MG/DL — SIGNIFICANT CHANGE UP (ref 8.4–10.5)
CHLORIDE SERPL-SCNC: 104 MMOL/L — SIGNIFICANT CHANGE UP (ref 96–108)
CO2 SERPL-SCNC: 22 MMOL/L — SIGNIFICANT CHANGE UP (ref 22–31)
CREAT SERPL-MCNC: 0.85 MG/DL — SIGNIFICANT CHANGE UP (ref 0.5–1.3)
EGFR: 77 ML/MIN/1.73M2 — SIGNIFICANT CHANGE UP
GLUCOSE SERPL-MCNC: 130 MG/DL — HIGH (ref 70–99)
HCT VFR BLD CALC: 35.1 % — SIGNIFICANT CHANGE UP (ref 34.5–45)
HGB BLD-MCNC: 11.2 G/DL — LOW (ref 11.5–15.5)
MCHC RBC-ENTMCNC: 27 PG — SIGNIFICANT CHANGE UP (ref 27–34)
MCHC RBC-ENTMCNC: 31.9 GM/DL — LOW (ref 32–36)
MCV RBC AUTO: 84.6 FL — SIGNIFICANT CHANGE UP (ref 80–100)
NRBC # BLD: 0 /100 WBCS — SIGNIFICANT CHANGE UP (ref 0–0)
PLATELET # BLD AUTO: 304 K/UL — SIGNIFICANT CHANGE UP (ref 150–400)
POTASSIUM SERPL-MCNC: 4.1 MMOL/L — SIGNIFICANT CHANGE UP (ref 3.5–5.3)
POTASSIUM SERPL-SCNC: 4.1 MMOL/L — SIGNIFICANT CHANGE UP (ref 3.5–5.3)
RBC # BLD: 4.15 M/UL — SIGNIFICANT CHANGE UP (ref 3.8–5.2)
RBC # FLD: 14.3 % — SIGNIFICANT CHANGE UP (ref 10.3–14.5)
SODIUM SERPL-SCNC: 136 MMOL/L — SIGNIFICANT CHANGE UP (ref 135–145)
WBC # BLD: 15.64 K/UL — HIGH (ref 3.8–10.5)
WBC # FLD AUTO: 15.64 K/UL — HIGH (ref 3.8–10.5)

## 2023-06-08 PROCEDURE — 99253 IP/OBS CNSLTJ NEW/EST LOW 45: CPT

## 2023-06-08 RX ORDER — KETOROLAC TROMETHAMINE 30 MG/ML
15 SYRINGE (ML) INJECTION EVERY 6 HOURS
Refills: 0 | Status: DISCONTINUED | OUTPATIENT
Start: 2023-06-08 | End: 2023-06-09

## 2023-06-08 RX ORDER — PANTOPRAZOLE SODIUM 20 MG/1
40 TABLET, DELAYED RELEASE ORAL
Refills: 0 | Status: DISCONTINUED | OUTPATIENT
Start: 2023-06-08 | End: 2023-06-09

## 2023-06-08 RX ORDER — ACETAMINOPHEN 500 MG
975 TABLET ORAL EVERY 8 HOURS
Refills: 0 | Status: DISCONTINUED | OUTPATIENT
Start: 2023-06-08 | End: 2023-06-09

## 2023-06-08 RX ORDER — OXYCODONE HYDROCHLORIDE 5 MG/1
5 TABLET ORAL EVERY 4 HOURS
Refills: 0 | Status: DISCONTINUED | OUTPATIENT
Start: 2023-06-08 | End: 2023-06-09

## 2023-06-08 RX ORDER — OXYCODONE HYDROCHLORIDE 5 MG/1
10 TABLET ORAL EVERY 4 HOURS
Refills: 0 | Status: DISCONTINUED | OUTPATIENT
Start: 2023-06-08 | End: 2023-06-09

## 2023-06-08 RX ORDER — HYDROMORPHONE HYDROCHLORIDE 2 MG/ML
0.5 INJECTION INTRAMUSCULAR; INTRAVENOUS; SUBCUTANEOUS ONCE
Refills: 0 | Status: DISCONTINUED | OUTPATIENT
Start: 2023-06-08 | End: 2023-06-09

## 2023-06-08 RX ORDER — CELECOXIB 200 MG/1
200 CAPSULE ORAL EVERY 12 HOURS
Refills: 0 | Status: DISCONTINUED | OUTPATIENT
Start: 2023-06-09 | End: 2023-06-09

## 2023-06-08 RX ADMIN — Medication 15 MILLIGRAM(S): at 11:36

## 2023-06-08 RX ADMIN — OXYCODONE HYDROCHLORIDE 5 MILLIGRAM(S): 5 TABLET ORAL at 13:32

## 2023-06-08 RX ADMIN — OXYCODONE HYDROCHLORIDE 5 MILLIGRAM(S): 5 TABLET ORAL at 03:18

## 2023-06-08 RX ADMIN — Medication 325 MILLIGRAM(S): at 11:36

## 2023-06-08 RX ADMIN — Medication 15 MILLIGRAM(S): at 18:12

## 2023-06-08 RX ADMIN — POLYETHYLENE GLYCOL 3350 17 GRAM(S): 17 POWDER, FOR SOLUTION ORAL at 21:56

## 2023-06-08 RX ADMIN — OXYCODONE HYDROCHLORIDE 5 MILLIGRAM(S): 5 TABLET ORAL at 18:41

## 2023-06-08 RX ADMIN — Medication 100 MILLIGRAM(S): at 05:45

## 2023-06-08 RX ADMIN — OXYCODONE HYDROCHLORIDE 5 MILLIGRAM(S): 5 TABLET ORAL at 14:32

## 2023-06-08 RX ADMIN — PANTOPRAZOLE SODIUM 40 MILLIGRAM(S): 20 TABLET, DELAYED RELEASE ORAL at 08:36

## 2023-06-08 RX ADMIN — OXYCODONE HYDROCHLORIDE 5 MILLIGRAM(S): 5 TABLET ORAL at 09:44

## 2023-06-08 RX ADMIN — Medication 15 MILLIGRAM(S): at 17:42

## 2023-06-08 RX ADMIN — OXYCODONE HYDROCHLORIDE 5 MILLIGRAM(S): 5 TABLET ORAL at 04:18

## 2023-06-08 RX ADMIN — OXYCODONE HYDROCHLORIDE 5 MILLIGRAM(S): 5 TABLET ORAL at 17:41

## 2023-06-08 RX ADMIN — Medication 1 TABLET(S): at 11:36

## 2023-06-08 RX ADMIN — CELECOXIB 200 MILLIGRAM(S): 200 CAPSULE ORAL at 06:16

## 2023-06-08 RX ADMIN — Medication 975 MILLIGRAM(S): at 13:32

## 2023-06-08 RX ADMIN — CELECOXIB 200 MILLIGRAM(S): 200 CAPSULE ORAL at 05:16

## 2023-06-08 RX ADMIN — Medication 975 MILLIGRAM(S): at 21:56

## 2023-06-08 RX ADMIN — SENNA PLUS 2 TABLET(S): 8.6 TABLET ORAL at 21:56

## 2023-06-08 RX ADMIN — ONDANSETRON 4 MILLIGRAM(S): 8 TABLET, FILM COATED ORAL at 03:58

## 2023-06-08 RX ADMIN — Medication 975 MILLIGRAM(S): at 14:32

## 2023-06-08 RX ADMIN — OXYCODONE HYDROCHLORIDE 5 MILLIGRAM(S): 5 TABLET ORAL at 10:44

## 2023-06-08 RX ADMIN — Medication 975 MILLIGRAM(S): at 22:56

## 2023-06-08 RX ADMIN — Medication 15 MILLIGRAM(S): at 12:46

## 2023-06-08 NOTE — DISCHARGE NOTE PROVIDER - CARE PROVIDER_API CALL
Mil Shahid  Orthopaedic Surgery  [address], [address]  [place] [address]  Phone: [phone]  Fax: [phone]  Follow Up Time:

## 2023-06-08 NOTE — DISCHARGE NOTE PROVIDER - NSDCCPTREATMENT_GEN_ALL_CORE_FT
PRINCIPAL PROCEDURE  Procedure: Revision, knee replacement, femoral component  Findings and Treatment: right knee osteoarthritis

## 2023-06-08 NOTE — DISCHARGE NOTE PROVIDER - NSDCFUADDINST_GEN_ALL_CORE_FT
Please follow  ***’s instruction sheets (IF APPLICABLE)   ACTIVITY:   - Weight bear as tolerated with assistive device. No strenuous activity, heavy lifting, driving or returning to work until cleared by MD.   - Apply a cold compress to the surgical site several times daily to reduce pain and swelling. For icing, twenty-minute sessions followed by an hour off is recommended. You should ice as frequently as possible. Ice should NEVER be placed directly on the skin. Wearing compression stockings during the first week after surgery can help reduce swelling in your knee, calf and foot, but is not required.      KNEE REPLACEMENTS  DO place a pillow under your heel when elevating the leg, to encourage full extension of the knee. Do NOT place a pillow behind your knee for comfort, as this can lead to permanent difficulty straightening your leg. It is normal to develop some swelling in the leg, ankle, and foot because of gravity.   DRESSING/SHOWERING:   (AQUACEL – brown gel dressing)   - You may shower, your dressing is water-resistant. Do not soak in bathtubs. Remove dressing after postop day 7, then leave incision open to air. You may do this yourself (simply peel it off), or you may ask for assistance from your visiting nurse. Keep your incision clean and dry. Do not pick at your incision. Do not apply creams, ointments or oils to your incision until cleared by your surgeon. Do not soak your incision in sitting water (ie tubs, pools, lakes, etc.) until cleared by your surgeon. Do not scrub the incision – instead, allow soap and water to flow over the incision and then pat it dry with a clean towel.     (PREVENA or CAM – incisional wound vac)   - You have an incisional wound vac dressing with tubing to attached canister/battery pack. You may shower but must keep battery pack dry at all times. The battery dies in 7 days then can remove dressing and leave open to air. Keep your incision clean and dry. Do not pick at your incision. Do not apply creams, ointments or oils to your incision until cleared by your surgeon. Do not soak your incision in sitting water (ie tubs, pools, lakes, etc.) until cleared by your surgeon. Do not scrub the incision – instead, allow soap and water to flow over the incision and then pat it dry with a clean towel.     MEDICATION/ANTICOAGULATION:   -You have been prescribed Aspirin, as a preventative to help prevent postoperative blood clots. Please take this medication as prescribed.    - You have been prescribed medications for pain:     - Tylenol for mild to moderate pain. Do not exceed 3,000mg daily.     - For more severe pain, take Tylenol with the addition of narcotic pain medication. Take this medication as prescribed. This medication may cause drowsiness or dizziness. Do not operate machinery. This medication may cause constipation.   - For any additional medications, follow instructions on the bottle.    -Try to have regular bowel movements. Take stool softener or laxative if necessary. You may wish to take Miralax daily until you have regular bowel movements.    - If you have been prescribed Aspirin or an anti-inflammatory, please take prilosec (omeprazole) once a day, before breakfast, until no longer taking Aspirin or anti-inflammatory. This will help protect your stomach.   - If you have a pain management physician, please follow-up with them postoperatively.    - If you experience any negative side effects of your medications, please call your surgeon's office to discuss.      FOLLOW-UP:   - Call to schedule an appt with Dr. Shahid for follow up.   - Please follow-up with your primary care physician or any other specialist you see postoperatively, if needed.    - Contact your doctor or go to the emergency room if you experience: fever greater than 101.5, chills, chest pain, difficulty breathing, redness or excessive drainage around the incision, other concerns.    ACTIVITY:   - Weight bear as tolerated with assistive device. No strenuous activity, heavy lifting, driving or returning to work until cleared by MD.   - Apply a cold compress to the surgical site several times daily to reduce pain and swelling. For icing, twenty-minute sessions followed by an hour off is recommended. You should ice as frequently as possible. Ice should NEVER be placed directly on the skin. Wearing compression stockings during the first week after surgery can help reduce swelling in your knee, calf and foot, but is not required.        DRESSING/SHOWERING:       (PREVENA or CAM – incisional wound vac)   - You have an incisional wound vac dressing with tubing to attached canister/battery pack. You may shower but must keep battery pack dry at all times. The battery dies in 7 days then can remove dressing and leave open to air. Keep your incision clean and dry. Do not pick at your incision. Do not apply creams, ointments or oils to your incision until cleared by your surgeon. Do not soak your incision in sitting water (ie tubs, pools, lakes, etc.) until cleared by your surgeon. Do not scrub the incision – instead, allow soap and water to flow over the incision and then pat it dry with a clean towel.     MEDICATION/ANTICOAGULATION:   -You have been prescribed Aspirin, as a preventative to help prevent postoperative blood clots. Please take this medication as prescribed.    - You have been prescribed medications for pain:     - Tylenol for mild to moderate pain. Do not exceed 3,000mg daily.     - For more severe pain, take Tylenol with the addition of narcotic pain medication. Take this medication as prescribed. This medication may cause drowsiness or dizziness. Do not operate machinery. This medication may cause constipation.   - For any additional medications, follow instructions on the bottle.    -Try to have regular bowel movements. Take stool softener or laxative if necessary. You may wish to take Miralax daily until you have regular bowel movements.    - If you have been prescribed Aspirin or an anti-inflammatory, please take prilosec (omeprazole) once a day, before breakfast, until no longer taking Aspirin or anti-inflammatory. This will help protect your stomach.   - If you have a pain management physician, please follow-up with them postoperatively.    - If you experience any negative side effects of your medications, please call your surgeon's office to discuss.      FOLLOW-UP:   - Call to schedule an appt with Dr. Shahid for follow up.   - Please follow-up with your primary care physician or any other specialist you see postoperatively, if needed.    - Contact your doctor or go to the emergency room if you experience: fever greater than 101.5, chills, chest pain, difficulty breathing, redness or excessive drainage around the incision, other concerns.

## 2023-06-08 NOTE — PHYSICAL THERAPY INITIAL EVALUATION ADULT - ADDITIONAL COMMENTS
Community ambulator who lives in a 1st floor condo with ~7 MANUEL. Ambulates with SC and RW and states she is independent with most ADLs, having  and daughter assist as needed.

## 2023-06-08 NOTE — CONSULT NOTE ADULT - SUBJECTIVE AND OBJECTIVE BOX
HPI "63yo female w/ right knee pain. Pt. states she had right knee arthroscopy in 2021, right tkr 2021, MASOOD 3/22, revision right tkr 8/22 in which polyexchange happened. Pt. c/o pain and swelling with walking. Pt. recently did steroid injections and PT which did not help with pain relief. Pt. uses cane assistance.     Presents today for elective Right Revision TKR w/ Dr. Shahid (06 Jun 2023 12:18)"    62F w Obesity (32), HTN, R knee OA w R TKR 2021, here for revision R TKR w Dr. Shahid 6/7, for HPT    Pt reports progressive Knee pain that did not improve after conservative therapies. Today states she has some medial calf numbness and tingling in her foot. States she feels it is overall improved after surgery. Did report short amount of lighteadedness when she stood up today. Eating wo N/V/Abd pain. No chest pain, dyspnea, fever, dysuria. +Flatus wo BM    ROS: 12 point ROS reviewed and otherwise negative as per HPI  FH: No DVT/PE  SH: Non smoker.     PAST MEDICAL & SURGICAL HISTORY:  Pain in right knee      HTN (hypertension)      H/O arthroscopy of right knee      S/P total knee replacement, right  11/21        Home Meds: Home Medications:    Allergies: Allergies    No Known Allergies    Intolerances      Soc:   Advanced Directives: Presumed Full Code     CURRENT MEDICATIONS:   --------------------------------------------------------------------------------------  Neurologic Medications  acetaminophen     Tablet .. 975 milliGRAM(s) Oral every 8 hours  aspirin 325 milliGRAM(s) Oral daily  HYDROmorphone  Injectable 0.5 milliGRAM(s) IV Push once  ketorolac   Injectable 15 milliGRAM(s) IV Push every 6 hours  ondansetron Injectable 4 milliGRAM(s) IV Push every 6 hours PRN Nausea and/or Vomiting  oxyCODONE    IR 5 milliGRAM(s) Oral every 4 hours PRN Moderate Pain (4 - 6)  oxyCODONE    IR 10 milliGRAM(s) Oral every 4 hours PRN Severe Pain (7 - 10)    Respiratory Medications    Cardiovascular Medications    Gastrointestinal Medications  bisacodyl Suppository 10 milliGRAM(s) Rectal daily PRN Constipation  lactated ringers. 1000 milliLiter(s) IV Continuous <Continuous>  magnesium hydroxide Suspension 30 milliLiter(s) Oral daily PRN Constipation  multivitamin 1 Tablet(s) Oral daily  pantoprazole    Tablet 40 milliGRAM(s) Oral before breakfast  polyethylene glycol 3350 17 Gram(s) Oral at bedtime  senna 2 Tablet(s) Oral at bedtime    Genitourinary Medications    Hematologic/Oncologic Medications    Antimicrobial/Immunologic Medications    Endocrine/Metabolic Medications    Topical/Other Medications    --------------------------------------------------------------------------------------    VITAL SIGNS, INS/OUTS (last 24 hours):  --------------------------------------------------------------------------------------  ICU Vital Signs Last 24 Hrs  T(C): 36.4 (08 Jun 2023 08:35), Max: 36.5 (07 Jun 2023 21:40)  T(F): 97.6 (08 Jun 2023 08:35), Max: 97.7 (07 Jun 2023 21:40)  HR: 65 (08 Jun 2023 08:35) (60 - 70)  BP: 132/72 (08 Jun 2023 08:35) (111/65 - 164/75)  BP(mean): 92 (08 Jun 2023 08:35) (81 - 113)  ABP: 172/86 (07 Jun 2023 20:00) (110/70 - 172/86)  ABP(mean): 122 (07 Jun 2023 20:00) (84 - 122)  RR: 17 (08 Jun 2023 08:35) (10 - 21)  SpO2: 94% (08 Jun 2023 08:35) (91% - 99%)    O2 Parameters below as of 08 Jun 2023 08:35  Patient On (Oxygen Delivery Method): room air          I&O's Summary    07 Jun 2023 07:01  -  08 Jun 2023 07:00  --------------------------------------------------------  IN: 1100 mL / OUT: 600 mL / NET: 500 mL    08 Jun 2023 07:01  -  08 Jun 2023 16:30  --------------------------------------------------------  IN: 360 mL / OUT: 500 mL / NET: -140 mL      --------------------------------------------------------------------------------------    EXAM:  GEN: female in NAD on RA  HEENT: NC/AT, MMM  CV: RRR, nml S1S2, no murmurs  PULM: nml effort, CTAB  ABD: Soft, non-distended, NABS, non-tender  NEURO  A/O x3, moving all extremities, Sensation intact  R knee dressing c/d/i. Cryocuff in place. Mild swelling around incision.   5/5 in plantarflex/ext b/l   PSYCH: Appropriate      LABS  --------------------------------------------------------------------------------------  Labs:  CAPILLARY BLOOD GLUCOSE                              11.2   15.64 )-----------( 304      ( 08 Jun 2023 05:30 )             35.1         06-08    136  |  104  |  18  ----------------------------<  130<H>  4.1   |  22  |  0.85      Calcium, Total Serum: 8.6 mg/dL (06-08-23 @ 05:30)      LFTs:         Coags:                  --------------------------------------------------------------------------------------    OTHER LABS    IMAGING RESULTS  ****************

## 2023-06-08 NOTE — CONSULT NOTE ADULT - CONSULT REASON
ALPRAZolam (XANAX) 0.25 MG tablet  Last Written Prescription Date:  8/03/17  Last Fill Quantity: 30 tablet,  # refills: 0   Last office visit: 4/4/2018 (Howie) with prescribing provider: 2/14/2017 (Ashley)   Future Office Visit:      
[employer] message sent to patient   Mishel VILLAFUERTE RN    
Looks like this patient has only seen Dr. Ramirez once and Dr. Denise once. Dr. Ramirez out until 8/23. Would need to await call back from patient for more information before refilling this controlled substance and she'd need to set up a follow up appointment with Dr. Ramirez. Thanks!  
Pt has not seen PCP since 2/2017-     Last RX 8/2017    Called and LMTCB    Desi GRAFF RN        
Will route to PCP for review  
co-management

## 2023-06-08 NOTE — DISCHARGE NOTE PROVIDER - NSDCCPCAREPLAN_GEN_ALL_CORE_FT
PRINCIPAL DISCHARGE DIAGNOSIS  Diagnosis: Osteoarthritis of right knee  Assessment and Plan of Treatment: revision of right total knee replacement

## 2023-06-08 NOTE — PHYSICAL THERAPY INITIAL EVALUATION ADULT - GENERAL OBSERVATIONS, REHAB EVAL
PT IE completed. Patient received semi supine in bed +heplock Iv, +R knee dressing + CAM + cryocuff C/D/I, +(B) SCDs, NAD, willing to work with PT

## 2023-06-08 NOTE — CONSULT NOTE ADULT - ASSESSMENT
62F w Obesity (32), HTN, R knee OA w R TKR 2021, here for revision R TKR w Dr. Shahid 6/7, for HPT    #Post-op state - pain controlled. PPx: ASA 325mg daily. on bowel regimen and incentive spirometer  #R Knee OA   - Scheduled - tylenol 975 q8h, celebrex 200mg bid   - PRN: oxycodone 5/10 q4h for mod/severe pain    #Blood loss anemia - hgb 11.2 from 12.5 outpatient baseline. Likely d/t OR losses. Asymptomatic at this time.  #Obesity - BMI 32. Affects all aspects of care    Plan  Overall doing well. for HPT

## 2023-06-08 NOTE — PHYSICAL THERAPY INITIAL EVALUATION ADULT - GAIT DEVIATIONS NOTED, PT EVAL
decreased tressa/increased time in double stance/decreased velocity of limb motion/decreased step length/decreased stride length/decreased weight-shifting ability

## 2023-06-08 NOTE — DISCHARGE NOTE PROVIDER - NSDCMRMEDTOKEN_GEN_ALL_CORE_FT
Tylenol Extra Strength 500 mg oral tablet: 2 tab(s) orally every 8 hours, As Needed -for mild pain MDD:4000mg    aspirin 325 mg oral tablet: 1 tab(s) orally once a day for DVT prophylaxis  celecoxib 200 mg oral capsule: 1 cap(s) orally every 12 hours  oxyCODONE 5 mg oral tablet: 1 tab(s) orally every 4 hours as needed for  severe pain MDD: 6  pantoprazole 40 mg oral delayed release tablet: 1 tab(s) orally once a day (before a meal)  Tylenol Extra Strength 500 mg oral tablet: 2 tab(s) orally every 8 hours, As Needed -for mild pain MDD:4000mg

## 2023-06-08 NOTE — DISCHARGE NOTE PROVIDER - HOSPITAL COURSE
Admit to Orthopaedics  Perioperative Antibiotics  DVT prophylaxis  Physical Therapy  Pain Management Admit to Orthopaedics  Perioperative Antibiotics  DVT prophylaxis  Physical Therapy  Pain Management    Inpatient Events:  Post-op RLE parasthesias distal to knee. Pillow placed behind knee. Admit to Orthopaedics  Perioperative Antibiotics  DVT prophylaxis  Physical Therapy  Pain Management    Inpatient Events:  Post-op RLE parasthesias distal to knee. Likely anesthesia-related. Improving prior to discharge.

## 2023-06-09 ENCOUNTER — TRANSCRIPTION ENCOUNTER (OUTPATIENT)
Age: 63
End: 2023-06-09

## 2023-06-09 VITALS
RESPIRATION RATE: 17 BRPM | HEART RATE: 61 BPM | DIASTOLIC BLOOD PRESSURE: 67 MMHG | OXYGEN SATURATION: 93 % | SYSTOLIC BLOOD PRESSURE: 103 MMHG | TEMPERATURE: 98 F

## 2023-06-09 LAB
ANION GAP SERPL CALC-SCNC: 8 MMOL/L — SIGNIFICANT CHANGE UP (ref 5–17)
BUN SERPL-MCNC: 23 MG/DL — SIGNIFICANT CHANGE UP (ref 7–23)
CALCIUM SERPL-MCNC: 8.4 MG/DL — SIGNIFICANT CHANGE UP (ref 8.4–10.5)
CHLORIDE SERPL-SCNC: 106 MMOL/L — SIGNIFICANT CHANGE UP (ref 96–108)
CO2 SERPL-SCNC: 24 MMOL/L — SIGNIFICANT CHANGE UP (ref 22–31)
CREAT SERPL-MCNC: 1.01 MG/DL — SIGNIFICANT CHANGE UP (ref 0.5–1.3)
EGFR: 63 ML/MIN/1.73M2 — SIGNIFICANT CHANGE UP
GLUCOSE SERPL-MCNC: 94 MG/DL — SIGNIFICANT CHANGE UP (ref 70–99)
HCT VFR BLD CALC: 29.7 % — LOW (ref 34.5–45)
HGB BLD-MCNC: 9.6 G/DL — LOW (ref 11.5–15.5)
MCHC RBC-ENTMCNC: 27.2 PG — SIGNIFICANT CHANGE UP (ref 27–34)
MCHC RBC-ENTMCNC: 32.3 GM/DL — SIGNIFICANT CHANGE UP (ref 32–36)
MCV RBC AUTO: 84.1 FL — SIGNIFICANT CHANGE UP (ref 80–100)
NRBC # BLD: 0 /100 WBCS — SIGNIFICANT CHANGE UP (ref 0–0)
PLATELET # BLD AUTO: 278 K/UL — SIGNIFICANT CHANGE UP (ref 150–400)
POTASSIUM SERPL-MCNC: 4.3 MMOL/L — SIGNIFICANT CHANGE UP (ref 3.5–5.3)
POTASSIUM SERPL-SCNC: 4.3 MMOL/L — SIGNIFICANT CHANGE UP (ref 3.5–5.3)
RBC # BLD: 3.53 M/UL — LOW (ref 3.8–5.2)
RBC # FLD: 14.9 % — HIGH (ref 10.3–14.5)
SODIUM SERPL-SCNC: 138 MMOL/L — SIGNIFICANT CHANGE UP (ref 135–145)
WBC # BLD: 11.21 K/UL — HIGH (ref 3.8–10.5)
WBC # FLD AUTO: 11.21 K/UL — HIGH (ref 3.8–10.5)

## 2023-06-09 RX ORDER — CELECOXIB 200 MG/1
1 CAPSULE ORAL
Qty: 28 | Refills: 0
Start: 2023-06-09 | End: 2023-06-22

## 2023-06-09 RX ORDER — OXYCODONE HYDROCHLORIDE 5 MG/1
1 TABLET ORAL
Qty: 30 | Refills: 0
Start: 2023-06-09 | End: 2023-06-13

## 2023-06-09 RX ORDER — PANTOPRAZOLE SODIUM 20 MG/1
1 TABLET, DELAYED RELEASE ORAL
Qty: 14 | Refills: 0
Start: 2023-06-09 | End: 2023-06-22

## 2023-06-09 RX ORDER — ASPIRIN/CALCIUM CARB/MAGNESIUM 324 MG
1 TABLET ORAL
Qty: 10 | Refills: 0
Start: 2023-06-09 | End: 2023-06-18

## 2023-06-09 RX ADMIN — Medication 15 MILLIGRAM(S): at 00:23

## 2023-06-09 RX ADMIN — MAGNESIUM HYDROXIDE 30 MILLILITER(S): 400 TABLET, CHEWABLE ORAL at 10:00

## 2023-06-09 RX ADMIN — Medication 975 MILLIGRAM(S): at 05:21

## 2023-06-09 RX ADMIN — Medication 15 MILLIGRAM(S): at 00:08

## 2023-06-09 RX ADMIN — CELECOXIB 200 MILLIGRAM(S): 200 CAPSULE ORAL at 05:20

## 2023-06-09 RX ADMIN — Medication 975 MILLIGRAM(S): at 14:23

## 2023-06-09 RX ADMIN — Medication 1 TABLET(S): at 14:23

## 2023-06-09 RX ADMIN — PANTOPRAZOLE SODIUM 40 MILLIGRAM(S): 20 TABLET, DELAYED RELEASE ORAL at 06:00

## 2023-06-09 RX ADMIN — CELECOXIB 200 MILLIGRAM(S): 200 CAPSULE ORAL at 06:21

## 2023-06-09 RX ADMIN — Medication 975 MILLIGRAM(S): at 15:23

## 2023-06-09 RX ADMIN — OXYCODONE HYDROCHLORIDE 5 MILLIGRAM(S): 5 TABLET ORAL at 11:01

## 2023-06-09 RX ADMIN — OXYCODONE HYDROCHLORIDE 5 MILLIGRAM(S): 5 TABLET ORAL at 10:01

## 2023-06-09 RX ADMIN — Medication 325 MILLIGRAM(S): at 14:23

## 2023-06-09 RX ADMIN — Medication 975 MILLIGRAM(S): at 06:21

## 2023-06-09 NOTE — DISCHARGE NOTE NURSING/CASE MANAGEMENT/SOCIAL WORK - PATIENT PORTAL LINK FT
You can access the FollowMyHealth Patient Portal offered by [facility] by registering at the following website: [email or web address]. By joining [facility]’s FollowMyHealth portal, you will also be able to view your health information using other applications (apps) compatible with our system.

## 2023-06-09 NOTE — DISCHARGE NOTE NURSING/CASE MANAGEMENT/SOCIAL WORK - NSDPLANGASYSPHN_GEN_ALL_CORE
Discharge Summary    Jez Mojica MRN# [id number]   YOB: 1968 Age: 55 year old     Date of Admission:  2/19/2023  Date of Discharge:  2/19/2023  Admitting Physician:  Tony Hanley MD  Discharging Service:  [facility] General Surgery   Primary Provider: Lili Tabor          Discharge Diagnoses:   Principle Diagnosis:  Cholecystitis    Secondary Diagnosis:  Anxiety  History of diverticulosis         Brief HPI   Jez Mojica is a 55 year old male who presents with epigastric pain.  Patient states he first noticed  this about 6 days ago he actually called EMS.  The pain improved so he declined to come in.  The pain then recurred 2 days after that but again improved.  This evening his epigastric pain returned.  He points to his epigastric area.  Does not radiate other than may be down to his left upper quadrant.  Does not go to the back.  Denies fevers or chills.  No nausea or vomiting.  He has a history of a colectomy.  Otherwise denies chest pain or shortness of breath.   The patient was evaluated in the emergency department and found to have cholelithiasis and acute cholecystitis. Laparoscopic cholecystectomy was recommended, risks and benefits discussed, questions answered. Patient wished to proceed with surgery.          Hospital Course   On 2/19/23 Jez Mojica underwent laparoscopic cholecystectomy. There were no intraoperative complications, please see op note for full details. Postoperatively he was transferred to the PACU where he recovered well. He was discharged home POD 0 in stable condition. All discharge instructions were reviewed and questions answered. He will follow-up with the surgical team in via phone or in clinic in 2-3 weeks.         Discharge Disposition:   Discharged to home          Condition on Discharge:   Discharge condition: Stable   Discharge vitals: Blood pressure (!) 140/85, pulse 70, temperature 98  F (36.7  C), temperature source Temporal, resp. rate 14,  "height 1.727 m (5' 8\"), weight 77.6 kg (171 lb 1.6 oz), SpO2 94 %.          Discharge Medications:     Current Discharge Medication List      START taking these medications    Details   HYDROcodone-acetaminophen (NORCO) 5-325 MG tablet Take 1-2 tablets by mouth every 4 hours as needed for moderate to severe pain  Qty: 12 tablet, Refills: 0    Associated Diagnoses: Calculus of gallbladder with acute cholecystitis without obstruction      senna-docusate (SENOKOT-S/PERICOLACE) 8.6-50 MG tablet Take 1-2 tablets by mouth 2 times daily as needed for constipation  Qty: 15 tablet, Refills: 0    Associated Diagnoses: Calculus of gallbladder with acute cholecystitis without obstruction         CONTINUE these medications which have NOT CHANGED    Details   sertraline (ZOLOFT) 100 MG tablet Take 2 tablets by mouth daily                  Discharge Instructions and Follow-Up:     After Care Instructions     Discharge Instructions - Follow-up Appointment (Days)      See discharge instruction sheet                   Arely Mcgowan PA-C     " Yes

## 2023-06-09 NOTE — PROGRESS NOTE ADULT - SUBJECTIVE AND OBJECTIVE BOX
Ortho Note    Pt seen and examined on morning rounds. Pt comfortable without complaints, pain controlled. Denies CP, SOB, N/V, numbness/tingling     Vital Signs Last 24 Hrs  T(C): 36.6 (09 Jun 2023 05:20), Max: 36.6 (09 Jun 2023 05:20)  T(F): 97.9 (09 Jun 2023 05:20), Max: 97.9 (09 Jun 2023 05:20)  HR: 65 (09 Jun 2023 05:20) (65 - 70)  BP: 100/62 (09 Jun 2023 05:20) (100/62 - 132/72)  BP(mean): 92 (08 Jun 2023 08:35) (92 - 92)  RR: 18 (09 Jun 2023 05:20) (17 - 18)  SpO2: 93% (09 Jun 2023 05:20) (93% - 94%)    Parameters below as of 09 Jun 2023 05:20  Patient On (Oxygen Delivery Method): room air      Physical Exam:  General: Pt Alert and oriented, NAD  R knee with CAM dressing, C/d/i wjth cryocuff  Pulses: 2+ dp, pt pulses, wwp, cap refill <3 sec  Sensation: Grossly decreased distal to the knee  Motor: EHL/FHL/TA/GS intact though slightly weaker than LLE                          11.2   15.64 )-----------( 304      ( 08 Jun 2023 05:30 )             35.1     06-08    136  |  104  |  18  ----------------------------<  130<H>  4.1   |  22  |  0.85    Ca    8.6      08 Jun 2023 05:30           A/P: 62yFemale POD#2 s/p R revision TKA,   - Pain Control  - f/u AM labs  - DVT ppx:  daily   - Post op abx: periop ancef  - F/u AM labs  - F/u exam  - PT, WBS: WBAT  - Dispo: home PT    
Ortho Note    Pt seen and examined. Comfortable without complaints, pain controlled  Denies CP, SOB, N/V. Reports "tingling"  in medial left lower leg distal to knee.      Vital Signs Last 24 Hrs  T(C): 36.4 (06-08-23 @ 08:35), Max: 36.4 (06-08-23 @ 05:20)  T(F): 97.6 (06-08-23 @ 08:35), Max: 97.6 (06-08-23 @ 08:35)  HR: 65 (06-08-23 @ 08:35) (65 - 70)  BP: 132/72 (06-08-23 @ 08:35) (117/68 - 132/72)  BP(mean): 92 (06-08-23 @ 08:35) (92 - 92)  RR: 17 (06-08-23 @ 08:35) (17 - 18)  SpO2: 94% (06-08-23 @ 08:35) (93% - 94%)  AVSS    General: Pt Alert and oriented, NAD  DSG- charles C/D/I  Pulses: +2DP, WWP feet  Sensation: mildly decreased distal to knee, mostly medial RLE, SILT LLE  Motor: 3/5 TA/FHL, 4/5 GS/FHL RLE ; 5/5 TA/GS/EHL/FHL LLE                          11.2   15.64 )-----------( 304      ( 08 Jun 2023 05:30 )             35.1     06-08    136  |  104  |  18  ----------------------------<  130<H>  4.1   |  22  |  0.85    Ca    8.6      08 Jun 2023 05:30        A/P: 62yFemale POD#1 s/p right total knee revision  - VSS, Labs WNL- appreciate internal medicine co-management recommendations  - RLE parasethesias- likely r/t anesthesia vs. peroneal nerve compression; pillow placed under R knee for peroneal nerve rest  - Pain Control  - DVT ppx:   - PT, WBS: WBAT  - OOB for meals, I/S  - continue bowel regimen  - dispo: pending PT eval, likely home with HPT pending PT clearance    Ortho Pager [phone]
Ortho Note    Pt seen and examined on morning rounds. Pt comfortable without complaints, pain controlled. Denies CP, SOB, N/V, numbness/tingling     Vital Signs Last 24 Hrs  T(C): 36.4 (08 Jun 2023 05:20), Max: 36.5 (07 Jun 2023 13:45)  T(F): 97.5 (08 Jun 2023 05:20), Max: 97.7 (07 Jun 2023 21:40)  HR: 70 (08 Jun 2023 05:20) (60 - 83)  BP: 117/68 (08 Jun 2023 05:20) (111/65 - 172/89)  BP(mean): 113 (07 Jun 2023 21:00) (81 - 113)  RR: 18 (08 Jun 2023 05:20) (10 - 21)  SpO2: 93% (08 Jun 2023 05:20) (91% - 99%)    Parameters below as of 08 Jun 2023 05:20  Patient On (Oxygen Delivery Method): room air      Physical Exam:  General: Pt Alert and oriented, NAD  R knee with CAM dressing, C/d/i wjth cryocuff  Pulses: 2+ dp, pt pulses, wwp, cap refill <3 sec  Sensation: Grossly decreased distal to the knee  Motor: EHL/FHL/TA/GS intact though slightly weaker than LLE          A/P: 62yFemale POD#1 s/p R revision TKA,   - Pain Control  - f/u AM labs  - DVT ppx:  daily   - Post op abx: periop ancef  - F/u AM labs  - F/u exam  - PT, WBS: WBAT  - Dispo: pending PT eval     
Ortho Note    Pt seen and examined. Comfortable without complaints, pain controlled  Denies CP, SOB, N/V. Reports sensation distal to knee is improving today. Also states after previous knee surgery,   she had numbness for "a few days post-op" which resolved. Is able to ambulate well and feels well today.    Vital Signs Last 24 Hrs  T(C): 36.4 (06-09-23 @ 08:35), Max: 36.6 (06-09-23 @ 05:20)  T(F): 97.6 (06-09-23 @ 08:35), Max: 97.9 (06-09-23 @ 05:20)  HR: 61 (06-09-23 @ 08:35) (61 - 65)  BP: 103/67 (06-09-23 @ 08:35) (100/62 - 103/67)  BP(mean): 79 (06-09-23 @ 08:35) (79 - 79)  RR: 17 (06-09-23 @ 08:35) (17 - 18)  SpO2: 93% (06-09-23 @ 08:35) (93% - 93%)  AVSS    General: Pt Alert and oriented, NAD  DSG- charles C/D/I  Pulses: +2DP, WWP feet  Sensation: SILT except mildly decreased to medial aspect of distal lower leg, improving from yesterday  Motor: 4/5 TA, 5/5 EHL RLE, 5/5 TA/GS/EHL/FHL LLE                            9.6    11.21 )-----------( 278      ( 09 Jun 2023 08:16 )             29.7     06-09    138  |  106  |  23  ----------------------------<  94  4.3   |  24  |  1.01    Ca    8.4      09 Jun 2023 08:16          A/P: 62yFemale POD#2 s/p right total knee revision  - VSS, Labs WNL- appreciate internal medicine co-management recommendations  - RLE parasethesias- likely r/t anesthesia, improving  - Pain Control  - DVT ppx:   - PT, WBS: WBAT  - OOB for meals, I/S  - continue bowel regimen  - dispo: cleared PT, plan for home today with HPT    Ortho Pager [phone]

## 2023-06-12 PROBLEM — I10 ESSENTIAL (PRIMARY) HYPERTENSION: Chronic | Status: ACTIVE | Noted: 2023-06-06

## 2023-06-15 ENCOUNTER — APPOINTMENT (OUTPATIENT)
Dept: ORTHOPEDIC SURGERY | Facility: CLINIC | Age: 63
End: 2023-06-15
Payer: OTHER MISCELLANEOUS

## 2023-06-15 ENCOUNTER — FORM ENCOUNTER (OUTPATIENT)
Age: 63
End: 2023-06-15

## 2023-06-15 DIAGNOSIS — R20.2 PARESTHESIA OF SKIN: ICD-10-CM
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
## 2023-06-15 DIAGNOSIS — T41.3X5A ADVERSE EFFECT OF LOCAL ANESTHETICS, INITIAL ENCOUNTER: ICD-10-CM

## 2023-06-15 DIAGNOSIS — T84.022A INSTABILITY OF INTERNAL RIGHT KNEE PROSTHESIS, INITIAL ENCOUNTER: ICD-10-CM
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
## 2023-06-15 DIAGNOSIS — I10 ESSENTIAL (PRIMARY) HYPERTENSION: ICD-10-CM

## 2023-06-15 DIAGNOSIS — E66.9 OBESITY, UNSPECIFIED: ICD-10-CM
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
## 2023-06-15 PROCEDURE — 99024 POSTOP FOLLOW-UP VISIT: CPT

## 2023-06-15 NOTE — REASON FOR VISIT
[Follow-Up Visit] : a follow-up visit for [Workers' Comp: Date of Injury: _____] : This visit is related to worker's compensation. Date of Injury: [unfilled] [Artificial Knee Joint] : an artificial knee joint [Total Knee Replacement Revision Surgery, Aftercare] : total knee replacement revision surgery, aftercare [FreeTextEntry2] : right knee revision 06/07

## 2023-06-15 NOTE — HISTORY OF PRESENT ILLNESS
[de-identified] : Patient returns today she is status post revision surgery postop day #7 at the time of surgery after patient was under general anesthesia we noted the patient was able to actively flex the knee to 105 degrees.  She was noted to have a fair amount of varus valgus instability in full extension so a revision was performed where we revise the femoral component to perform a complete synovectomy found the knee to be very stable with flexion to at least 100 1520 degrees.  However the patient today states that she cannot bend the knee.

## 2023-06-15 NOTE — DISCUSSION/SUMMARY
[de-identified] : Not sure exactly what is going on with this patient we have pictures from the operating room that showed the need to be flexing quite well.  I believe there is may be a form of guarding since under anesthesia previous surgery she could bend to 105 degrees.  Patient will be sent for vigorous physical therapy follow-up in 1 month's time.

## 2023-07-13 ENCOUNTER — APPOINTMENT (OUTPATIENT)
Dept: ORTHOPEDIC SURGERY | Facility: CLINIC | Age: 63
End: 2023-07-13
Payer: OTHER MISCELLANEOUS

## 2023-07-13 PROCEDURE — 99024 POSTOP FOLLOW-UP VISIT: CPT

## 2023-07-13 NOTE — HISTORY OF PRESENT ILLNESS
[de-identified] : Patient presents today 6 weeks status post right knee revision.  Unfortunately patient has once again developed significant stiffness secondary to scar formation despite the fact that they are in the revision she had achieved excellent range of motion of 0 to at least 120 degrees.  Patient was told by someone that she may have a blood clot due to the increased swelling about her right calf patient is concerned about that.

## 2023-07-13 NOTE — DISCUSSION/SUMMARY
[de-identified] : Patient be sent for Doppler ultrasound to help evaluate for potential blood clot.  I will have her referred to one of our revision specialist Dr. Mcintosh for second opinion regarding treatment options at this point for the patient.\par \par Today's consultation lasted 25 minutes

## 2023-07-13 NOTE — PHYSICAL EXAM
[de-identified] : Patient cannot actively bend beyond 25 degrees.  The wound is well-healed.  There is some modest swelling of the calf no tenderness.  No redness no erythema noted.  Neurovascular intact distally.

## 2023-08-11 ENCOUNTER — LABORATORY RESULT (OUTPATIENT)
Age: 63
End: 2023-08-11

## 2023-08-11 ENCOUNTER — APPOINTMENT (OUTPATIENT)
Dept: UROLOGY | Facility: CLINIC | Age: 63
End: 2023-08-11
Payer: MEDICARE

## 2023-08-11 VITALS
TEMPERATURE: 98.3 F | DIASTOLIC BLOOD PRESSURE: 90 MMHG | HEIGHT: 62 IN | WEIGHT: 167 LBS | OXYGEN SATURATION: 96 % | BODY MASS INDEX: 30.73 KG/M2 | HEART RATE: 81 BPM | SYSTOLIC BLOOD PRESSURE: 147 MMHG

## 2023-08-11 VITALS
TEMPERATURE: 98.3 F | DIASTOLIC BLOOD PRESSURE: 90 MMHG | WEIGHT: 167 LBS | SYSTOLIC BLOOD PRESSURE: 147 MMHG | BODY MASS INDEX: 30.73 KG/M2 | HEIGHT: 62 IN | OXYGEN SATURATION: 96 % | HEART RATE: 81 BPM

## 2023-08-11 DIAGNOSIS — N20.0 CALCULUS OF KIDNEY: ICD-10-CM

## 2023-08-11 PROCEDURE — 99204 OFFICE O/P NEW MOD 45 MIN: CPT

## 2023-08-13 LAB
APPEARANCE: CLEAR
BACTERIA UR CULT: NORMAL
BILIRUBIN URINE: NEGATIVE
BLOOD URINE: ABNORMAL
COLOR: YELLOW
GLUCOSE QUALITATIVE U: NEGATIVE MG/DL
KETONES URINE: NEGATIVE MG/DL
LEUKOCYTE ESTERASE URINE: ABNORMAL
NITRITE URINE: NEGATIVE
PH URINE: 5.5
PROTEIN URINE: NEGATIVE MG/DL
SPECIFIC GRAVITY URINE: 1.02
UROBILINOGEN URINE: 0.2 MG/DL

## 2023-08-13 NOTE — HISTORY OF PRESENT ILLNESS
[FreeTextEntry1] : 64 yo with self limited bothersome LUTS - following a knee replacement she states her symptoms have resolved  but she did have a stone in her right kidney - and as such will require a workup  8/2/23  CT scan with and without contrast  nonobstructing right renal calculus cholelithiasis 2.0 cm right ovarian cyst - appears mildly complex mesenteric panniculitis  8/3/23 lower pole right renal stone - 5 mm normal left kidney and simple right renal cyst normal bladder sonogram gallstones

## 2023-08-13 NOTE — PHYSICAL EXAM
[General Appearance - Well Developed] : well developed [General Appearance - Well Nourished] : well nourished [Normal Appearance] : normal appearance [Well Groomed] : well groomed [General Appearance - In No Acute Distress] : no acute distress [Edema] : no peripheral edema [Respiration, Rhythm And Depth] : normal respiratory rhythm and effort [Exaggerated Use Of Accessory Muscles For Inspiration] : no accessory muscle use [Abdomen Soft] : soft [Abdomen Tenderness] : non-tender [Costovertebral Angle Tenderness] : no ~M costovertebral angle tenderness [Urinary Bladder Findings] : the bladder was normal on palpation [FreeTextEntry1] : s/p knee replacement  [] : no rash [No Focal Deficits] : no focal deficits [Oriented To Time, Place, And Person] : oriented to person, place, and time [Affect] : the affect was normal [Mood] : the mood was normal [Not Anxious] : not anxious

## 2023-08-13 NOTE — ASSESSMENT
[FreeTextEntry1] : 62 yo with bothersome LUTS - resolved  5 mm RIGHT lower pole stone  asymptomatic  - instructed to present to ER for fever, chills, nausea and vomitting - litholink - UA and Culture - cytology - f/u in 6 months will call to review  [Urinary Symptom or Sign (788.99\R39.89)] : implantation

## 2023-08-13 NOTE — LETTER BODY
[Dear  ___] : Dear  [unfilled], [Consult Letter:] : I had the pleasure of evaluating your patient, [unfilled]. [Please see my note below.] : Please see my note below. [Sincerely,] : Sincerely, [FreeTextEntry3] : Mercedes Chahal MD, FACS

## 2023-08-15 LAB — URINE CYTOLOGY: NORMAL

## 2023-10-08 PROCEDURE — 36415 COLL VENOUS BLD VENIPUNCTURE: CPT

## 2023-10-08 PROCEDURE — 97161 PT EVAL LOW COMPLEX 20 MIN: CPT

## 2023-10-08 PROCEDURE — 86900 BLOOD TYPING SEROLOGIC ABO: CPT

## 2023-10-08 PROCEDURE — 97116 GAIT TRAINING THERAPY: CPT

## 2023-10-08 PROCEDURE — 85027 COMPLETE CBC AUTOMATED: CPT

## 2023-10-08 PROCEDURE — C1776: CPT

## 2023-10-08 PROCEDURE — C1713: CPT

## 2023-10-08 PROCEDURE — 86901 BLOOD TYPING SEROLOGIC RH(D): CPT

## 2023-10-08 PROCEDURE — 73560 X-RAY EXAM OF KNEE 1 OR 2: CPT

## 2023-10-08 PROCEDURE — 86850 RBC ANTIBODY SCREEN: CPT

## 2023-10-08 PROCEDURE — 80048 BASIC METABOLIC PNL TOTAL CA: CPT

## 2023-10-19 NOTE — PATIENT PROFILE ADULT - TOBACCO USE
In an effort to ensure that our patients LiveWell, a Team Member has reviewed your chart and identified an opportunity to provide the best care possible. An attempt was made to discuss or schedule overdue Preventive or Disease Management screening.     The Outcome was Contact was not made, left messageIf you have any questions or need help with scheduling, contact your primary care provider.. Care Gaps include Diabetes and Immunizations.    Left msg with pt     Current every day smoker DVT ppx: Lovenox   Diet: NPO pending improvement in mental status. Will need dysphagia screen.   Dispo: Pending clinical improvement

## 2023-11-10 ENCOUNTER — LABORATORY RESULT (OUTPATIENT)
Age: 63
End: 2023-11-10

## 2023-11-10 ENCOUNTER — APPOINTMENT (OUTPATIENT)
Dept: UROLOGY | Facility: CLINIC | Age: 63
End: 2023-11-10
Payer: MEDICARE

## 2023-11-10 VITALS
HEART RATE: 78 BPM | HEIGHT: 62 IN | TEMPERATURE: 98.1 F | OXYGEN SATURATION: 97 % | DIASTOLIC BLOOD PRESSURE: 92 MMHG | BODY MASS INDEX: 30.18 KG/M2 | RESPIRATION RATE: 14 BRPM | WEIGHT: 164 LBS | SYSTOLIC BLOOD PRESSURE: 146 MMHG

## 2023-11-10 PROCEDURE — 99214 OFFICE O/P EST MOD 30 MIN: CPT

## 2023-11-13 LAB
APPEARANCE: CLEAR
BILIRUBIN URINE: NEGATIVE
BLOOD URINE: ABNORMAL
COLOR: YELLOW
GLUCOSE QUALITATIVE U: NEGATIVE MG/DL
KETONES URINE: NEGATIVE MG/DL
LEUKOCYTE ESTERASE URINE: ABNORMAL
NITRITE URINE: POSITIVE
PH URINE: 6
PROTEIN URINE: NEGATIVE MG/DL
SPECIFIC GRAVITY URINE: <1.005
UROBILINOGEN URINE: 0.2 MG/DL

## 2023-11-20 ENCOUNTER — NON-APPOINTMENT (OUTPATIENT)
Age: 63
End: 2023-11-20

## 2023-11-20 RX ORDER — NITROFURANTOIN (MONOHYDRATE/MACROCRYSTALS) 25; 75 MG/1; MG/1
100 CAPSULE ORAL TWICE DAILY
Qty: 14 | Refills: 0 | Status: ACTIVE | COMMUNITY
Start: 2023-11-20 | End: 1900-01-01

## 2023-11-22 NOTE — PHYSICAL EXAM
Discussed patient on rounds this morning and chart reviewed. Patient remains acute on IV Rocephin and 3LNC. CM discussed home care services for a visiting nurse. Home care choice list provided. Patient stated he will consider. CM remains available and will continue to assess for home oxygen requirements.    [de-identified] : Right knee remains slightly warm there is soft tissue swelling but no effusion range of motion is 0-130°. The knee is stable to AP stress varus valgus stress there is some mild crepitus with range of motion. There is significant quadriceps atrophy of the right as compared to the left. Definite mild to moderate medial joint line tenderness to palpation.

## 2024-01-09 ENCOUNTER — APPOINTMENT (OUTPATIENT)
Dept: ORTHOPEDIC SURGERY | Facility: CLINIC | Age: 64
End: 2024-01-09
Payer: OTHER MISCELLANEOUS

## 2024-01-09 PROCEDURE — 99214 OFFICE O/P EST MOD 30 MIN: CPT

## 2024-01-16 ENCOUNTER — APPOINTMENT (OUTPATIENT)
Dept: ORTHOPEDIC SURGERY | Facility: CLINIC | Age: 64
End: 2024-01-16
Payer: OTHER MISCELLANEOUS

## 2024-01-16 PROCEDURE — 99213 OFFICE O/P EST LOW 20 MIN: CPT

## 2024-01-16 NOTE — PHYSICAL EXAM
[de-identified] : Unchanged from previous visit  Patient cannot actively bend beyond 25 degrees.  The wound is well-healed.  There is some modest swelling of the calf no tenderness.  No redness no erythema noted.  Neurovascular intact distally.

## 2024-01-16 NOTE — DISCUSSION/SUMMARY
[de-identified] : Patient I had a long discussion about her options I would like for her to see 1 final specialist Dr. Cleveland at [facility].  We will see if there is any further consideration that should be given to a repeat revision surgery.  Patient is 63 years old I would like to improve her range of motion but also have had the previous revision that intraoperatively had gained significant improvement of her range of motion progressed to a fairly stiff knee once again.  We will be sending her to see the second consultant and we look forward to any further helpful recommendations.  This consultation lasted 25 minutes.

## 2024-01-16 NOTE — HISTORY OF PRESENT ILLNESS
[de-identified] : Patient returns today she see no improvement with the previously prescribed anti-inflammatory/Medrol Dosepak.  At this point we have sent her for consultation with Dr. Soriano of who did not believe that any surgical intervention would convincingly improve her range of motion.  Recall the patient is status post initial knee arthroscopy knee replacement surgery and partial revision and open synovectomy which have all left her with limited range of motion.

## 2024-01-16 NOTE — REASON FOR VISIT
[Follow-Up Visit] : a follow-up visit for [Workers' Comp: Date of Injury: _____] : This visit is related to worker's compensation. Date of Injury: [unfilled] [Artificial Knee Joint] : an artificial knee joint [Knee Pain] : knee pain [FreeTextEntry2] : WORKERS COMP. RIGHT KNEE REPLACED 11/2021 , REVISED 6/2023

## 2024-01-24 ENCOUNTER — APPOINTMENT (OUTPATIENT)
Dept: ORTHOPEDIC SURGERY | Facility: CLINIC | Age: 64
End: 2024-01-24
Payer: MEDICARE

## 2024-01-24 ENCOUNTER — RESULT REVIEW (OUTPATIENT)
Age: 64
End: 2024-01-24

## 2024-01-24 ENCOUNTER — OUTPATIENT (OUTPATIENT)
Dept: OUTPATIENT SERVICES | Facility: HOSPITAL | Age: 64
LOS: 1 days | End: 2024-01-24
Payer: MEDICARE

## 2024-01-24 VITALS
OXYGEN SATURATION: 96 % | SYSTOLIC BLOOD PRESSURE: 146 MMHG | DIASTOLIC BLOOD PRESSURE: 90 MMHG | HEIGHT: 62 IN | WEIGHT: 164 LBS | BODY MASS INDEX: 30.18 KG/M2 | HEART RATE: 83 BPM

## 2024-01-24 DIAGNOSIS — Z98.890 OTHER SPECIFIED POSTPROCEDURAL STATES: Chronic | ICD-10-CM

## 2024-01-24 PROCEDURE — 72100 X-RAY EXAM L-S SPINE 2/3 VWS: CPT

## 2024-01-24 PROCEDURE — 99214 OFFICE O/P EST MOD 30 MIN: CPT

## 2024-01-24 PROCEDURE — 72170 X-RAY EXAM OF PELVIS: CPT

## 2024-01-24 PROCEDURE — 72100 X-RAY EXAM L-S SPINE 2/3 VWS: CPT | Mod: 26

## 2024-01-24 PROCEDURE — 72170 X-RAY EXAM OF PELVIS: CPT | Mod: 26

## 2024-01-24 NOTE — REVIEW OF SYSTEMS
[Joint Pain] : joint pain [Arthralgia] : arthralgia [Joint Stiffness] : joint stiffness [Joint Swelling] : joint swelling [Negative] : Endocrine

## 2024-01-24 NOTE — REASON FOR VISIT
[Initial Visit] : an initial visit for [Workers' Comp: Date of Injury: _____] : This visit is related to worker's compensation. Date of Injury: [unfilled] [Knee Pain] : knee pain

## 2024-02-01 NOTE — HISTORY OF PRESENT ILLNESS
[de-identified] : 1/24/2024 62 y/o F presenting for first evaluation of right total knee replacement. Patient has had multiple operations on her right knee. She originally had a right knee arthroscopy in 2021, followed by a right total knee replacement in November of 2021, followed by a right knee revision in August of 2022, followed by another right knee revision in June of 2023. Patient reports that her knee is constantly stiff, and has noticed an inability to bend the knee. She notes pain about eight out of 10, worse walking and standing for extended periods of time. She presents today with a walker and states that she is unable to ambulate without it. She has been taking Tylenol for as needed for pain. She said she last participated in physical therapy in December 6th of 2023, and says she has not had any improvement during her three years of physical therapy.   Denies any significant medical history, allergies, and currently lives in an apartment right now. She also complains of midline low back pain, right buttock pain, and radiating pain from the right lateral hip through the lateral aspect of the distal thigh.  Patient has been following consistently with her primary surgeon, Dr. Shahid. She also recently saw Dr. Mcintosh who recommended against any further operative management.

## 2024-02-01 NOTE — PROCEDURE
[de-identified] : Procedure: Knee joint aspiration Laterality: RIght Indication: diagnostic - discussed with patient Skin prep: alcohol and chlorhexidine Anesthesia: ethyl chloride spray Needle: 18-gauge Portal: superolateral Aspiration: 14 cc of normal appearing synovial fluid Injectate: none Dressing: Band-aid Complications: None This was sent for the usual inflammatory/septic analysis panel and was also administered to an alpha defensin lateral flow point of care assay which was read as negative for prosthetic joint infection in the office at 15 20 and 25 minutes post procedure.

## 2024-02-01 NOTE — DISCUSSION/SUMMARY
[de-identified] : 1/24/2024 62 y/o F with recurrent stiffness of the right knee after multiple revision knee arthroplasties in the setting of apparent right greater than left lumbar radiculopathy, and mild right hip osteoarthritis. - Her presentation is confusing. Based on her physical exam and the nature of her knee aspiration today, this does not appear to be a typical case of arthrofibrosis insofar as she does not appear to have any abnormal scar tissue formation that is palpable within the suprapatellar pouch or the medial or lateral gutters and she seems to have normal patellar mobility. She does not seem to have any of the typical fibrosis of the superior soft tissues as is normally seen in these types of cases. Furthermore the degree of stiffness with which she presents and the degree of her lack of cooperation with physical exam is somewhat unusual. Her range of motion does seem to improve when she is distracted, and she was able to perform better knee flexion for her x-rays than she was for her exam today. There may be some non-physiologic sources of pain and stiffness at work here. - For the moment we'll begin by ruling out infection with a right knee aspiration as well as serum inflammatory markers today. - I will obtain a CT scan of the right knee to assess for any subtle component malpositioning or loosening. - She has evidence of bilateral neurologic weakness affecting the distal lower extremities right worse than left and so we will obtain pelvic and lumbar spine x-rays today and follow this up with a lumbar spine MRI to search for neural element impingement. If any is found then she will be referred to an appropriate spinal specialist for intervention. - Provided that the infection workup is negative, we may consider an examination under anesthesia to assess whether her office stiffness is reflective of true arthrofibrosis. However similar to Dr. Mcintosh I have serious reservations about the utility that further revision knee arthroplasty could afford her given her very unsuccessful course over multiple operations thus far. - We will set the next follow up for four weeks' time with no new x-rays. - We reviewed that she should also work on a routine dedicated home exercise program. However, given her nonprogression with formal physical therapy to this point and her stated difficulties with insurance coverage, I would agree with Dr. Shahid that she does not need to return to formal outpatient physical therapy right now.

## 2024-02-01 NOTE — PHYSICAL EXAM
[de-identified] : General appearance: well nourished and hydrated, pleasant, alert and oriented x 3, cooperative. HEENT: normocephalic, EOM intact, wearing mask, external auditory canal clear. Cardiovascular: no lower leg edema, no varicosities, dorsalis pedis pulses palpable and symmetric. Lymphatics: no palpable lymphadenopathy, no lymphedema. Neurologic: sensation is normal, no muscle weakness in upper or lower extremities, patella tendon reflexes present and symmetric. left ankle dorsiflexion strength is 4/5, and she is only just able to attain neutral dorsiflexion against gravity. Right ankle dorsiflexion is 2/5, and the maximal dorsiflexion that she is able to maintain is approximately 20 degrees of plantar flexion from the ankle. Dermatologic: skin moist, warm, no rash. Spine: cervical spine with normal lordosis and painless range of motion, thoracic spine with normal kyphosis and painless range of motion, lumbosacral spine with normal lordosis and painless range of motion. No tenderness to palpation along midline spine and paraspinal musculature. Sacroiliac joints nontender bilaterally. Negative SLR and crossed SLR tests bilaterally. Gait: Presents with the use of a rolling walker, she is able to ambulate with the walker but is essentially keeping the right knee and full extension at all times and is circumducting her hip. She seems to demonstrate some lack of ankle mobility and strength as well. She is not dorsiflexing her right ankle to progress her gait pattern.   Left knee: - Focal soft tissue swelling: none - Ecchymosis: none - Erythema: none - Effusion: none, no palpable Baker's cyst - Wounds: none - Alignment: constitutional varus alignment - Tenderness: negative patella compression test, medial and lateral joint lines non TTP - ROM: 0-140 - Collateral laxity: none - Cruciate laxity: none - Meniscal signs: negative Maddy and Yoseph - Popliteal angle (degrees): 10 - Quad strength: 4+/5   Right knee: - Focal soft tissue swelling: none - Ecchymosis: none - Erythema: none - Effusion: small residual, no palpable Baker's cyst, There does not appear to be any bulky arthrofibrosis within the suprapatellar pouch. And the patella maintains approximately one quadrant of medial and lateral mobility. - Wounds: well healed midline incision, benign appearing - Alignment: normal - Tenderness: none - ROM: 0-10, pt appears to be resisting knee flexion with active effort of her extensor mechanism as opposed to coming to a hard stop as is typically the case with arthrofibrosis - Collateral laxity: none - Cruciate laxity: deferred - Popliteal angle (degrees): deferred - Quad strength: 5/5, but she does express a complaint of posterior knee pain with resisted flexion.  Right hip exam was markedly limited secondary to her lack of knee mobility, but she does appear to have pain with FADIR, as well as anterior and lateral hip pain with palpation. [de-identified] : AP pelvis and lumbar spine XRs were taken today at [facility].  Pelvic alignment is mildly oblique with the right side up. Right hip demonstrates mild osteoarthritis, mild acetabular dysplasia, and no evidence of primary osteonecrosis. Left hip demonstrates mild osteoarthritis, no deformity, and no evidence of primary osteonecrosis. Lumbar spine demonstrates mild scoliosis of an adolescent idiopathic pattern with associated mild-moderate multilevel spondylosis.  Bilateral knee x-rays were reviewed from [facility], taken on January 9th, 2024. Right knee: - A revision total knee arthroplasty in position. It does appear to be a primary Fabian and Nephew tibial component with a stemmed revision femoral component. All components appear to be well fixed and in reasonable alignment without evidence of periprosthetic fracture or loosening. - The overall alignment appears to be in some varus. - The patella sits markedly baja and it is difficult for me to appreciate the patellar tracking on the provided merchant view. - There does appear to be some scattered cloudy opacifications throughout the anterior subcutaneous soft tissues suggestive of metallic debris, though it is not consistent with shrapnel.  Left knee: - Mild varus alignment. - Tricompartmental osteoarthritis, most pronounced medially, Kellgren Bola 2, - Patella sits at normal height and tracks centrally.  We also reviewed a non-contrast MRI of the right knee taken at [facility] on December 18th, 2023. - The MRI demonstrates an intact revision total knee arthroplasty with a semi-constrained tibial insert. - No evidence was found to suggest bony resorption or component loosening of any of the prosthetic components. - There is no evidence of neurovascular impingement, no evidence of collateral ligament disruption, and no disproportionate muscle atrophy. - Some thickening is noted of the quadriceps tendon, but otherwise the extensive mechanism is not disrupted. - A small to moderate joint effusion with synovitis is present.

## 2024-02-01 NOTE — HISTORY OF PRESENT ILLNESS
[de-identified] : 1/24/2024 62 y/o F presenting for first evaluation of right total knee replacement. Patient has had multiple operations on her right knee. She originally had a right knee arthroscopy in 2021, followed by a right total knee replacement in November of 2021, followed by a right knee revision in August of 2022, followed by another right knee revision in June of 2023. Patient reports that her knee is constantly stiff, and has noticed an inability to bend the knee. She notes pain about eight out of 10, worse walking and standing for extended periods of time. She presents today with a walker and states that she is unable to ambulate without it. She has been taking Tylenol for as needed for pain. She said she last participated in physical therapy in December 6th of 2023, and says she has not had any improvement during her three years of physical therapy.   Denies any significant medical history, allergies, and currently lives in an apartment right now. She also complains of midline low back pain, right buttock pain, and radiating pain from the right lateral hip through the lateral aspect of the distal thigh.  Patient has been following consistently with her primary surgeon, Dr. Shahid. She also recently saw Dr. Mcintosh who recommended against any further operative management.

## 2024-02-01 NOTE — PHYSICAL EXAM
[de-identified] : General appearance: well nourished and hydrated, pleasant, alert and oriented x 3, cooperative. HEENT: normocephalic, EOM intact, wearing mask, external auditory canal clear. Cardiovascular: no lower leg edema, no varicosities, dorsalis pedis pulses palpable and symmetric. Lymphatics: no palpable lymphadenopathy, no lymphedema. Neurologic: sensation is normal, no muscle weakness in upper or lower extremities, patella tendon reflexes present and symmetric. left ankle dorsiflexion strength is 4/5, and she is only just able to attain neutral dorsiflexion against gravity. Right ankle dorsiflexion is 2/5, and the maximal dorsiflexion that she is able to maintain is approximately 20 degrees of plantar flexion from the ankle. Dermatologic: skin moist, warm, no rash. Spine: cervical spine with normal lordosis and painless range of motion, thoracic spine with normal kyphosis and painless range of motion, lumbosacral spine with normal lordosis and painless range of motion. No tenderness to palpation along midline spine and paraspinal musculature. Sacroiliac joints nontender bilaterally. Negative SLR and crossed SLR tests bilaterally. Gait: Presents with the use of a rolling walker, she is able to ambulate with the walker but is essentially keeping the right knee and full extension at all times and is circumducting her hip. She seems to demonstrate some lack of ankle mobility and strength as well. She is not dorsiflexing her right ankle to progress her gait pattern.   Left knee: - Focal soft tissue swelling: none - Ecchymosis: none - Erythema: none - Effusion: none, no palpable Baker's cyst - Wounds: none - Alignment: constitutional varus alignment - Tenderness: negative patella compression test, medial and lateral joint lines non TTP - ROM: 0-140 - Collateral laxity: none - Cruciate laxity: none - Meniscal signs: negative Maddy and Yoseph - Popliteal angle (degrees): 10 - Quad strength: 4+/5   Right knee: - Focal soft tissue swelling: none - Ecchymosis: none - Erythema: none - Effusion: small residual, no palpable Baker's cyst, There does not appear to be any bulky arthrofibrosis within the suprapatellar pouch. And the patella maintains approximately one quadrant of medial and lateral mobility. - Wounds: well healed midline incision, benign appearing - Alignment: normal - Tenderness: none - ROM: 0-10, pt appears to be resisting knee flexion with active effort of her extensor mechanism as opposed to coming to a hard stop as is typically the case with arthrofibrosis - Collateral laxity: none - Cruciate laxity: deferred - Popliteal angle (degrees): deferred - Quad strength: 5/5, but she does express a complaint of posterior knee pain with resisted flexion.  Right hip exam was markedly limited secondary to her lack of knee mobility, but she does appear to have pain with FADIR, as well as anterior and lateral hip pain with palpation. [de-identified] : AP pelvis and lumbar spine XRs were taken today at [facility].  Pelvic alignment is mildly oblique with the right side up. Right hip demonstrates mild osteoarthritis, mild acetabular dysplasia, and no evidence of primary osteonecrosis. Left hip demonstrates mild osteoarthritis, no deformity, and no evidence of primary osteonecrosis. Lumbar spine demonstrates mild scoliosis of an adolescent idiopathic pattern with associated mild-moderate multilevel spondylosis.  Bilateral knee x-rays were reviewed from [facility], taken on January 9th, 2024. Right knee: - A revision total knee arthroplasty in position. It does appear to be a primary Fabian and Nephew tibial component with a stemmed revision femoral component. All components appear to be well fixed and in reasonable alignment without evidence of periprosthetic fracture or loosening. - The overall alignment appears to be in some varus. - The patella sits markedly baja and it is difficult for me to appreciate the patellar tracking on the provided merchant view. - There does appear to be some scattered cloudy opacifications throughout the anterior subcutaneous soft tissues suggestive of metallic debris, though it is not consistent with shrapnel.  Left knee: - Mild varus alignment. - Tricompartmental osteoarthritis, most pronounced medially, Kellgren Bola 2, - Patella sits at normal height and tracks centrally.  We also reviewed a non-contrast MRI of the right knee taken at [facility] on December 18th, 2023. - The MRI demonstrates an intact revision total knee arthroplasty with a semi-constrained tibial insert. - No evidence was found to suggest bony resorption or component loosening of any of the prosthetic components. - There is no evidence of neurovascular impingement, no evidence of collateral ligament disruption, and no disproportionate muscle atrophy. - Some thickening is noted of the quadriceps tendon, but otherwise the extensive mechanism is not disrupted. - A small to moderate joint effusion with synovitis is present.

## 2024-02-01 NOTE — DISCUSSION/SUMMARY
[de-identified] : 1/24/2024 62 y/o F with recurrent stiffness of the right knee after multiple revision knee arthroplasties in the setting of apparent right greater than left lumbar radiculopathy, and mild right hip osteoarthritis. - Her presentation is confusing. Based on her physical exam and the nature of her knee aspiration today, this does not appear to be a typical case of arthrofibrosis insofar as she does not appear to have any abnormal scar tissue formation that is palpable within the suprapatellar pouch or the medial or lateral gutters and she seems to have normal patellar mobility. She does not seem to have any of the typical fibrosis of the superior soft tissues as is normally seen in these types of cases. Furthermore the degree of stiffness with which she presents and the degree of her lack of cooperation with physical exam is somewhat unusual. Her range of motion does seem to improve when she is distracted, and she was able to perform better knee flexion for her x-rays than she was for her exam today. There may be some non-physiologic sources of pain and stiffness at work here. - For the moment we'll begin by ruling out infection with a right knee aspiration as well as serum inflammatory markers today. - I will obtain a CT scan of the right knee to assess for any subtle component malpositioning or loosening. - She has evidence of bilateral neurologic weakness affecting the distal lower extremities right worse than left and so we will obtain pelvic and lumbar spine x-rays today and follow this up with a lumbar spine MRI to search for neural element impingement. If any is found then she will be referred to an appropriate spinal specialist for intervention. - Provided that the infection workup is negative, we may consider an examination under anesthesia to assess whether her office stiffness is reflective of true arthrofibrosis. However similar to Dr. Mcintosh I have serious reservations about the utility that further revision knee arthroplasty could afford her given her very unsuccessful course over multiple operations thus far. - We will set the next follow up for four weeks' time with no new x-rays. - We reviewed that she should also work on a routine dedicated home exercise program. However, given her nonprogression with formal physical therapy to this point and her stated difficulties with insurance coverage, I would agree with Dr. Shahid that she does not need to return to formal outpatient physical therapy right now.

## 2024-02-01 NOTE — PROCEDURE
[de-identified] : Procedure: Knee joint aspiration Laterality: RIght Indication: diagnostic - discussed with patient Skin prep: alcohol and chlorhexidine Anesthesia: ethyl chloride spray Needle: 18-gauge Portal: superolateral Aspiration: 14 cc of normal appearing synovial fluid Injectate: none Dressing: Band-aid Complications: None This was sent for the usual inflammatory/septic analysis panel and was also administered to an alpha defensin lateral flow point of care assay which was read as negative for prosthetic joint infection in the office at 15 20 and 25 minutes post procedure.

## 2024-02-01 NOTE — END OF VISIT
[FreeTextEntry3] : All medical record entries made by the Scribe were at my, Dr. Que Alicia's, direction and personally dictated by me on 01/24/2024. I have reviewed the chart and agree that the record accurately reflects my personal performance of the history, physical exam, assessment and plan. I have also personally directed, reviewed, and agreed with the chart.

## 2024-02-12 ENCOUNTER — NON-APPOINTMENT (OUTPATIENT)
Age: 64
End: 2024-02-12

## 2024-02-12 LAB
B PERT IGG+IGM PNL SER: ABNORMAL
COLOR FLD: YELLOW
CRP SERPL-MCNC: <3 MG/L
EOSINOPHIL # FLD MANUAL: 0 %
ERYTHROCYTE [SEDIMENTATION RATE] IN BLOOD BY WESTERGREN METHOD: 21 MM/HR
FLUID INTAKE SUBSTANCE CLASS: NORMAL
HCT VFR BLD CALC: 43.1 %
HGB BLD-MCNC: 13.4 G/DL
JOINT CULTURE: NORMAL
JOINT PCR PANEL: NOT DETECTED
JOINT PCR SOURCE: NORMAL
LYMPHOCYTES # FLD MANUAL: 27 %
MCHC RBC-ENTMCNC: 26 PG
MCHC RBC-ENTMCNC: 31.1 GM/DL
MCV RBC AUTO: 83.5 FL
MESOTHL CELL NFR FLD: 0 %
MONOS+MACROS NFR FLD MANUAL: 71 %
NEUTS SEG # FLD MANUAL: 2 %
NRBC # FLD: 0 %
PLATELET # BLD AUTO: 392 K/UL
RBC # BLD: 5.16 M/UL
RBC # FLD MANUAL: 1000 /UL
RBC # FLD: 15 %
SYCRY CLARITY: ABNORMAL
SYCRY COLOR: YELLOW
SYCRY ID: ABNORMAL
SYCRY TUBE: NORMAL
TOTAL CELLS COUNTED FLD: 59 /UL
TUBE TYPE: NORMAL
UNIDENT CELLS NFR FLD MANUAL: 0 %
VARIANT LYMPHS # FLD MANUAL: 0 %
WBC # FLD AUTO: 10.73 K/UL

## 2024-02-21 ENCOUNTER — APPOINTMENT (OUTPATIENT)
Dept: ORTHOPEDIC SURGERY | Facility: CLINIC | Age: 64
End: 2024-02-21
Payer: MEDICARE

## 2024-02-21 VITALS
OXYGEN SATURATION: 96 % | HEIGHT: 62 IN | BODY MASS INDEX: 30.18 KG/M2 | WEIGHT: 164 LBS | DIASTOLIC BLOOD PRESSURE: 87 MMHG | HEART RATE: 72 BPM | SYSTOLIC BLOOD PRESSURE: 143 MMHG

## 2024-02-21 PROCEDURE — 99214 OFFICE O/P EST MOD 30 MIN: CPT

## 2024-02-23 ENCOUNTER — APPOINTMENT (OUTPATIENT)
Dept: PAIN MANAGEMENT | Facility: CLINIC | Age: 64
End: 2024-02-23

## 2024-02-23 ENCOUNTER — APPOINTMENT (OUTPATIENT)
Dept: PAIN MANAGEMENT | Facility: CLINIC | Age: 64
End: 2024-02-23
Payer: OTHER MISCELLANEOUS

## 2024-02-23 VITALS
SYSTOLIC BLOOD PRESSURE: 161 MMHG | BODY MASS INDEX: 30.18 KG/M2 | DIASTOLIC BLOOD PRESSURE: 101 MMHG | HEIGHT: 62 IN | OXYGEN SATURATION: 95 % | WEIGHT: 164 LBS | HEART RATE: 77 BPM

## 2024-02-23 DIAGNOSIS — M51.26 OTHER INTERVERTEBRAL DISC DISPLACEMENT, LUMBAR REGION: ICD-10-CM

## 2024-02-23 DIAGNOSIS — M54.16 RADICULOPATHY, LUMBAR REGION: ICD-10-CM

## 2024-02-23 PROCEDURE — 99204 OFFICE O/P NEW MOD 45 MIN: CPT | Mod: 25

## 2024-02-23 PROCEDURE — 76942 ECHO GUIDE FOR BIOPSY: CPT

## 2024-02-23 PROCEDURE — 64450 NJX AA&/STRD OTHER PN/BRANCH: CPT

## 2024-02-27 ENCOUNTER — NON-APPOINTMENT (OUTPATIENT)
Age: 64
End: 2024-02-27

## 2024-02-27 NOTE — HISTORY OF PRESENT ILLNESS
[de-identified] : 2/21/2024 64 y/o Bosnian speaking female returning for a reevaluation of right knee arthrofibrosis following multiply revised total knee arthroplasty.  We last saw a month ago and indicated her for an infection/mechanical complication workup. She did follow through with all the steps that we recommended and is here to review the results. She reports no clinical changes compared to the last visit.  1/24/2024 64 y/o F presenting for first evaluation of right total knee replacement. Patient has had multiple operations on her right knee. She originally had a right knee arthroscopy in 2021, followed by a right total knee replacement in November of 2021, followed by a right knee revision in August of 2022, followed by another right knee revision in June of 2023. Patient reports that her knee is constantly stiff, and has noticed an inability to bend the knee. She notes pain about eight out of 10, worse walking and standing for extended periods of time. She presents today with a walker and states that she is unable to ambulate without it. She has been taking Tylenol for as needed for pain. She said she last participated in physical therapy in December 6th of 2023, and says she has not had any improvement during her three years of physical therapy.   Denies any significant medical history, allergies, and currently lives in an apartment right now. She also complains of midline low back pain, right buttock pain, and radiating pain from the right lateral hip through the lateral aspect of the distal thigh.  Patient has been following consistently with her primary surgeon, Dr. Shahid. She also recently saw Dr. Mcintosh who recommended against any further operative management.

## 2024-02-27 NOTE — END OF VISIT
[FreeTextEntry3] : All medical record entries made by the Scribe were at my, Dr. Que Alicia's, direction and personally dictated by me on 02/21/2024. I have reviewed the chart and agree that the record accurately reflects my personal performance of the history, physical exam, assessment and plan. I have also personally directed, reviewed, and agreed with the chart.

## 2024-02-27 NOTE — DISCUSSION/SUMMARY
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 - We have definitively ruled out prosthetic joint infection. - We did have a finding of calcium pyrophosphate crystals on her knee aspirate indicating that she has at least dormant pseudogout. I think that this can explain at least some of her recurrent effusions and towards this end I recommended that she seek consultation with rheumatology to consider medical management options. - Likewise, the CT scan ruled out any periprosthetic fracture, hardware loosening, or major component malposition. - I do not think that there is any indication to consider another revision of her arthroplasty. She has had multiple surgical failures with recurrence of the same severe degree of arthrofibrosis and I recommended against any further revision knee arthroplasty surgeries. She was not interested in this to begin with. - I think that we should continue to maximize non-surgical treatment options and so I provided her with a script for a passive flexion promoting knee brace to be used at nighttime and I directed her to a local orthotist to have this made. - Aside from the referral to rheumatology, we also provided her with a referral to pain management to consider minimally invasive treatment options such as genicular nerve blocks. - She may follow up with me as needed.

## 2024-02-27 NOTE — PHYSICAL EXAM
[de-identified] : General appearance: well nourished and hydrated, pleasant, alert and oriented x 3, cooperative. HEENT: normocephalic, EOM intact, wearing mask, external auditory canal clear. Cardiovascular: no lower leg edema, no varicosities, dorsalis pedis pulses palpable and symmetric. Lymphatics: no palpable lymphadenopathy, no lymphedema. Neurologic: sensation is normal, no muscle weakness in upper or lower extremities, patella tendon reflexes present and symmetric. left ankle dorsiflexion strength is 4/5, and she is only just able to attain neutral dorsiflexion against gravity. Right ankle dorsiflexion is 2/5, and the maximal dorsiflexion that she is able to maintain is approximately 20 degrees of plantar flexion from the ankle. Dermatologic: skin moist, warm, no rash. Spine: cervical spine with normal lordosis and painless range of motion, thoracic spine with normal kyphosis and painless range of motion, lumbosacral spine with normal lordosis and painless range of motion. No tenderness to palpation along midline spine and paraspinal musculature. Sacroiliac joints nontender bilaterally. Negative SLR and crossed SLR tests bilaterally. Gait: Presents with the use of a rolling walker, she is able to ambulate with the walker but is essentially keeping the right knee and full extension at all times and is circumducting her hip. She seems to demonstrate some lack of ankle mobility and strength as well. She is not dorsiflexing her right ankle to progress her gait pattern.   Left knee: - Focal soft tissue swelling: none - Ecchymosis: none - Erythema: none - Effusion: none, no palpable Baker's cyst - Wounds: none - Alignment: constitutional varus alignment - Tenderness: negative patella compression test, medial and lateral joint lines non TTP - ROM: 0-140 - Collateral laxity: none - Cruciate laxity: none - Meniscal signs: negative Maddy and Yoseph - Popliteal angle (degrees): 10 - Quad strength: 4+/5   Right knee: - Focal soft tissue swelling: none - Ecchymosis: none - Erythema: none - Effusion: small residual, no palpable Baker's cyst, There does not appear to be any bulky arthrofibrosis within the suprapatellar pouch. And the patella maintains approximately one quadrant of medial and lateral mobility. - Wounds: well healed midline incision, benign appearing - Alignment: normal - Tenderness: none - ROM: 0-25, which is somewhat improved as compared to the 0 to 10 that I could appreciate at the last visit. - Collateral laxity: none - Cruciate laxity: deferred - Popliteal angle (degrees): deferred - Quad strength: 5/5, but she does express a complaint of posterior knee pain with resisted flexion.  Right hip exam was markedly limited secondary to her lack of knee mobility, but she does appear to have pain with FADIR, as well as anterior and lateral hip pain with palpation. [de-identified] : We again reviewed that the knee aspiration performed at last visit demonstrated no bacterial growth and benign cell counts, but did show +CPPD crystals suggestive of pseudogout. We again reviewed that the CT of the knee demonstrated no fracture, osteolysis, loosening, or significant component malpositioning.

## 2024-02-27 NOTE — HISTORY OF PRESENT ILLNESS
[de-identified] : 2/21/2024 64 y/o Bosnian speaking female returning for a reevaluation of right knee arthrofibrosis following multiply revised total knee arthroplasty.  We last saw a month ago and indicated her for an infection/mechanical complication workup. She did follow through with all the steps that we recommended and is here to review the results. She reports no clinical changes compared to the last visit.  1/24/2024 64 y/o F presenting for first evaluation of right total knee replacement. Patient has had multiple operations on her right knee. She originally had a right knee arthroscopy in 2021, followed by a right total knee replacement in November of 2021, followed by a right knee revision in August of 2022, followed by another right knee revision in June of 2023. Patient reports that her knee is constantly stiff, and has noticed an inability to bend the knee. She notes pain about eight out of 10, worse walking and standing for extended periods of time. She presents today with a walker and states that she is unable to ambulate without it. She has been taking Tylenol for as needed for pain. She said she last participated in physical therapy in December 6th of 2023, and says she has not had any improvement during her three years of physical therapy.   Denies any significant medical history, allergies, and currently lives in an apartment right now. She also complains of midline low back pain, right buttock pain, and radiating pain from the right lateral hip through the lateral aspect of the distal thigh.  Patient has been following consistently with her primary surgeon, Dr. Shahid. She also recently saw Dr. Mcintosh who recommended against any further operative management.

## 2024-02-27 NOTE — DISCUSSION/SUMMARY
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 - We have definitively ruled out prosthetic joint infection. - We did have a finding of calcium pyrophosphate crystals on her knee aspirate indicating that she has at least dormant pseudogout. I think that this can explain at least some of her recurrent effusions and towards this end I recommended that she seek consultation with rheumatology to consider medical management options. - Likewise, the CT scan ruled out any periprosthetic fracture, hardware loosening, or major component malposition. - I do not think that there is any indication to consider another revision of her arthroplasty. She has had multiple surgical failures with recurrence of the same severe degree of arthrofibrosis and I recommended against any further revision knee arthroplasty surgeries. She was not interested in this to begin with. - I think that we should continue to maximize non-surgical treatment options and so I provided her with a script for a passive flexion promoting knee brace to be used at nighttime and I directed her to a local orthotist to have this made. - Aside from the referral to rheumatology, we also provided her with a referral to pain management to consider minimally invasive treatment options such as genicular nerve blocks. - She may follow up with me as needed.

## 2024-02-27 NOTE — PHYSICAL EXAM
[de-identified] : General appearance: well nourished and hydrated, pleasant, alert and oriented x 3, cooperative. HEENT: normocephalic, EOM intact, wearing mask, external auditory canal clear. Cardiovascular: no lower leg edema, no varicosities, dorsalis pedis pulses palpable and symmetric. Lymphatics: no palpable lymphadenopathy, no lymphedema. Neurologic: sensation is normal, no muscle weakness in upper or lower extremities, patella tendon reflexes present and symmetric. left ankle dorsiflexion strength is 4/5, and she is only just able to attain neutral dorsiflexion against gravity. Right ankle dorsiflexion is 2/5, and the maximal dorsiflexion that she is able to maintain is approximately 20 degrees of plantar flexion from the ankle. Dermatologic: skin moist, warm, no rash. Spine: cervical spine with normal lordosis and painless range of motion, thoracic spine with normal kyphosis and painless range of motion, lumbosacral spine with normal lordosis and painless range of motion. No tenderness to palpation along midline spine and paraspinal musculature. Sacroiliac joints nontender bilaterally. Negative SLR and crossed SLR tests bilaterally. Gait: Presents with the use of a rolling walker, she is able to ambulate with the walker but is essentially keeping the right knee and full extension at all times and is circumducting her hip. She seems to demonstrate some lack of ankle mobility and strength as well. She is not dorsiflexing her right ankle to progress her gait pattern.   Left knee: - Focal soft tissue swelling: none - Ecchymosis: none - Erythema: none - Effusion: none, no palpable Baker's cyst - Wounds: none - Alignment: constitutional varus alignment - Tenderness: negative patella compression test, medial and lateral joint lines non TTP - ROM: 0-140 - Collateral laxity: none - Cruciate laxity: none - Meniscal signs: negative Maddy and Yoseph - Popliteal angle (degrees): 10 - Quad strength: 4+/5   Right knee: - Focal soft tissue swelling: none - Ecchymosis: none - Erythema: none - Effusion: small residual, no palpable Baker's cyst, There does not appear to be any bulky arthrofibrosis within the suprapatellar pouch. And the patella maintains approximately one quadrant of medial and lateral mobility. - Wounds: well healed midline incision, benign appearing - Alignment: normal - Tenderness: none - ROM: 0-25, which is somewhat improved as compared to the 0 to 10 that I could appreciate at the last visit. - Collateral laxity: none - Cruciate laxity: deferred - Popliteal angle (degrees): deferred - Quad strength: 5/5, but she does express a complaint of posterior knee pain with resisted flexion.  Right hip exam was markedly limited secondary to her lack of knee mobility, but she does appear to have pain with FADIR, as well as anterior and lateral hip pain with palpation. [de-identified] : We again reviewed that the knee aspiration performed at last visit demonstrated no bacterial growth and benign cell counts, but did show +CPPD crystals suggestive of pseudogout. We again reviewed that the CT of the knee demonstrated no fracture, osteolysis, loosening, or significant component malpositioning.

## 2024-03-07 ENCOUNTER — APPOINTMENT (OUTPATIENT)
Dept: PAIN MANAGEMENT | Facility: CLINIC | Age: 64
End: 2024-03-07

## 2024-03-07 VITALS
DIASTOLIC BLOOD PRESSURE: 91 MMHG | HEART RATE: 75 BPM | SYSTOLIC BLOOD PRESSURE: 154 MMHG | WEIGHT: 165 LBS | BODY MASS INDEX: 30.36 KG/M2 | OXYGEN SATURATION: 97 % | HEIGHT: 62 IN

## 2024-03-07 PROCEDURE — 62323 NJX INTERLAMINAR LMBR/SAC: CPT | Mod: 1L

## 2024-03-07 NOTE — DATA REVIEWED
[FreeTextEntry1] : Lumbar XR personally reviewed: Multiple disc herniations Type 2 modic changes L4 L5 S1 multifidi atrophy foraminal and central canal stenosis at multiple levels  knee XR and MRI personally reviewed

## 2024-03-07 NOTE — ASSESSMENT
[FreeTextEntry1] : 63 year old female presents with chronic right knee pain after work related slip and fall. She has had multiple knee surgeries with complications. Today she presents with chronic stiffness and pain in her right knee. She has been evaluated for infection and other etiologies. Rheumatology evaluation pending. She is a great candidate for PNS of the right saphenous nerve given her predominant symptoms of the medial aspect of her right knee. She likely has concurrent lumbar radiculopathy given her low back and posterolateral leg pain.   The potential benefits as well as rare but possible risks were reviewed with the patient.  These risks including infection including epidural abscess, meningitis, osteomyelitis, and discitis, bleeding including epidural hematoma, nerve injury, paralysis, failure to relieve pain or worse pain, headache, pneumothorax, elevated blood sugars, allergic reactions, adverse reactions to medications, vasovagal reactions, falls, etc. Following that discussion, we decided together that the potential benefits outweigh the potential risks and we decided to proceed with scheduling the procedure.  I answered all the patient's questions about the procedure including the technical details of the procedure and also offered that if any other questions arise, we will also be happy to answer those on the day of the procedure. All questions today were answered to the patient's satisfaction, and the patient stated their verbal understanding.        Plan - Right saphenous nerve block performed today in office - Recommend SPR PNS of Right saphenous nerve - Recommend Lumbar L3-L4 ILESI - Psychology and medical clearance provided today.  - Follow up in 2 weeks.   Procedure Note:  Correct patient identity, procedure to be performed and as applicable, correct side and site, correct patient position, and availability of implants, special equipment or special requirements. The use of direct ultrasound visualization of the needle (rather than a non-guided injection) was required to ensure accurate injection placement so there can be diagnostic specificity when evaluating effectiveness of the injection, and for safety purposes to minimize risk of bleeding or injury to nearby neurovascular structures.  Informed Consent: Following denial of allergy and review of potential side effects and complications including but not necessarily limited to infection, allergic reaction, local tissue breakdown, systemic effects of corticosteroids, elevation of blood glucose, injury to soft tissue and/or nerves and seizure, the patient indicated understanding and agreed to proceed.  Technique: The procedure was carried out under sterile technique. The patient was placed in a supine position.  Prior to the procedure, the target structures were scanned using a linear array transducer. The optimal needle approach for the procedure was determined. The region was then prepared with a chloroprep swab and re-examined using the same transducer and sterile ultrasound transducer gel. Local anesthesia was obtained with 1% lidocaine.  Under direct ultrasound visualization, using a 25g 3.5in needle, the VMO, sartorius and femoral artery was identified. The saphenous nerve lie just superficial to the artery in between the VMO and sartorius muscles. After negative aspiration, 1cc 0.25% bupivacaine and 2mL normal saline was injected.  The needle was withdrawn. At no time during the procedure did the patient experience paresthesia's. A simple dressing was placed over the procedure site.  Addendum:  The patient reports that she has 80% pain relief of the medial and anterior knee pain.  The diagnostic block provided 12 hour short term pain relief. Because of the patients severe and unremitting focal nerve pain at the saphenous nerve I am recommending a 60 day temporary Peripheral Nerve Stimulator to be placed at the saphenous nerve to provide the patient with long term relief of their chronic knee pain. The goal is for the patient to perform more intensive physical therapy and expand her range of motion in the knee joint.   Zenifa has failed conservative measures: Patient has tried and failed physical therapy, conservative medications, TENS units and massage.   Lack of surgical contraindication: This patient is not a candidate for additional knee surgery. This patient does not have any medical or surgical contraindications for the procedure. This patient is not high risk for infection or other medical risks.   Lack of substance abuse: This patient has no active substance use issues or use disorders.

## 2024-03-07 NOTE — HISTORY OF PRESENT ILLNESS
[Back Pain] : back pain [Neck Pain] : neck pain [___ yrs] : [unfilled] year(s) ago [Episodic] : episodic [2] : a current pain level of 2/10 [4] : an average pain level of 4/10 [1] : a minimum pain level of 1/10 [6] : a maximum pain level of 6/10 [Dull] : dull [Aching] : aching [Throbbing] : throbbing [Burning] : burning [Sitting] : sitting [Standing] : standing [Walking] : walking [Transitioning] : transitioning [Laying] : laying [FreeTextEntry1] : The patient is a 63 year old female who presents to us for chronic right knee pain. 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 She was evaluated by orthopedic surgery and eventually underwent right knee surgery which resulted in complications and eventually received a total knee arthroplasty. She has undergone 4 knee surgeries and currently Has of chronic right knee pain and swelling and stiffness. She is referred for interventional pain options. Patient denies any new numbness, tingling, balance issues, bowel incontinence, bladder incontinence, or saddle anesthesia.  [FreeTextEntry7] : Right knee pain. Right > left lower back pain, radiating down posterior/lateral aspect of her leg

## 2024-03-07 NOTE — PHYSICAL EXAM
[Normal muscle bulk without asymmetry] : normal muscle bulk without asymmetry [Within functional limits and without pain] : within functional limits and without pain [Ataxic] : ataxic [Antalgic] : antalgic [Walker] : ambulates with walker [SLR] : positive straight leg raise [LE] : Sensory: Intact in bilateral lower extremities [Patellar] : patellar 2+ and symmetric bilaterally [Achilles] : Achilles 2+ and symmetric bilaterally [Normal] : Skin color, texture, turgor normal, no rashes or lesions in the four extremities and back [Spinous Process Tenderness] : no spinous process tenderness [Paraspinal Tenderness] : no paraspinal tenderness [Facet Tenderness] : no facet tenderness [Gurrola's Sign] : negative Gurrola's Sign [de-identified] : Edema of RLE. Post surgical scar. No erythema.

## 2024-03-07 NOTE — REVIEW OF SYSTEMS
38w5d [Back Pain] : back pain [Joint Pain] : joint pain [Numbness] : numbness [Negative] : Gastrointestinal [Dizziness] : no dizziness

## 2024-03-07 NOTE — PROCEDURE
[FreeTextEntry1] : Interlaminar lumbar epidural steroid injection L3-4  After obtaining consent, pre-procedure blood pressure and heart rate were stable and recorded in the nursing record. Standard monitors were applied. The patient was placed in the prone position. The lumbosacral area was widely prepped with chloroprep and draped in sterile fashion. Fluoroscopic guidance was used to identify the desired interlaminar space and for needle placement. Subcutaneous 0.5% lidocaine was used to anesthetize the skin overlying the target. A 20-gauge Tuohy needle was advanced to the epidural space using loss of resistance to air technique and AP / contralateral oblique views under fluoroscopy. There was no evidence of heme or CSF and no paresthesias were elicited with needle placement.   Confirmation of epidural needle placement was performed with 0.5 cc of omnipaque,. Next 3 ml preservative free normal saline  and 10 mg dexamethasone was administered epidurally with no pain elicited on injection. The needle was removed, skin cleansed with alcohol and a sterile bandage was applied. The patient tolerated the procedure well and no complications were encountered. Following the procedure, the patient's vital signs were stable. The patient was discharged home in good condition with post-procedural instructions.  Time Out: Immediately prior to the procedure, the following was verbally confirmed that there is a consent form and that the correct patient, planned procedure, site and side are consistent with documentation and that necessary equipment and/or blood products are available prior to the start of the case.  Complications: none EBL: <5 cc

## 2024-03-15 ENCOUNTER — LABORATORY RESULT (OUTPATIENT)
Age: 64
End: 2024-03-15

## 2024-03-15 ENCOUNTER — APPOINTMENT (OUTPATIENT)
Dept: UROLOGY | Facility: CLINIC | Age: 64
End: 2024-03-15
Payer: MEDICARE

## 2024-03-15 VITALS
OXYGEN SATURATION: 95 % | SYSTOLIC BLOOD PRESSURE: 137 MMHG | HEART RATE: 80 BPM | TEMPERATURE: 98 F | DIASTOLIC BLOOD PRESSURE: 88 MMHG | RESPIRATION RATE: 14 BRPM

## 2024-03-15 DIAGNOSIS — R39.9 UNSPECIFIED SYMPTOMS AND SIGNS INVOLVING THE GENITOURINARY SYSTEM: ICD-10-CM

## 2024-03-15 DIAGNOSIS — B49 UNSPECIFIED MYCOSIS: ICD-10-CM

## 2024-03-15 PROCEDURE — 99214 OFFICE O/P EST MOD 30 MIN: CPT

## 2024-03-15 RX ORDER — NITROFURANTOIN (MONOHYDRATE/MACROCRYSTALS) 25; 75 MG/1; MG/1
100 CAPSULE ORAL
Qty: 14 | Refills: 0 | Status: ACTIVE | COMMUNITY
Start: 2024-03-15 | End: 1900-01-01

## 2024-03-15 NOTE — PHYSICAL EXAM
[General Appearance - Well Developed] : well developed [General Appearance - Well Nourished] : well nourished [Normal Appearance] : normal appearance [Well Groomed] : well groomed [General Appearance - In No Acute Distress] : no acute distress [Abdomen Soft] : soft [Abdomen Tenderness] : non-tender [Costovertebral Angle Tenderness] : no ~M costovertebral angle tenderness [Urinary Bladder Findings] : the bladder was normal on palpation [Edema] : no peripheral edema [] : no respiratory distress [Respiration, Rhythm And Depth] : normal respiratory rhythm and effort [Exaggerated Use Of Accessory Muscles For Inspiration] : no accessory muscle use [Oriented To Time, Place, And Person] : oriented to person, place, and time [Affect] : the affect was normal [Mood] : the mood was normal [Not Anxious] : not anxious [FreeTextEntry1] : s/p knee replacement  [No Focal Deficits] : no focal deficits

## 2024-03-15 NOTE — ASSESSMENT
[FreeTextEntry1] : 64 yo here for follow up - suspects she has a UTI and vaginal yeast infection - she was originally seen with self limited bothersome LUTS - following a knee replacement she states her symptoms have resolved  but she did have a stone in her right kidney - and as such will require a workup  prior when she had symptoms I failed to identify a source of infection  Urine culture - 11/11/2023 E. Coli - > 100,000  8/2/23 CT scan with and without contrast  nonobstructing right renal calculus cholelithiasis 2.0 cm right ovarian cyst - appears mildly complex mesenteric panniculitis  8/3/23 lower pole right renal stone - 5 mm normal left kidney and simple right renal cyst normal bladder sonogram gallstones  Plan  64 yo with UTI and nephrolithiasis culture obtained  - will treat UTI  - litholink was not performed and is not in a position following her knee surgery to do a litholink  - Diflucan - macrobid - will schedule f/u once mobility improves    [Urinary Symptom or Sign (788.99\R39.89)] : implantation

## 2024-03-15 NOTE — HISTORY OF PRESENT ILLNESS
[FreeTextEntry1] : 64 yo here for follow up - suspects she has a UTI and vaginal yeast infection - she was originally seen with self limited bothersome LUTS - following a knee replacement she states her symptoms have resolved  but she did have a stone in her right kidney - and as such will require a workup  prior when she had symptoms I failed to identify a source of infection  Urine culture - 11/11/2023 E. Coli - > 100,000  8/2/23 CT scan with and without contrast  nonobstructing right renal calculus cholelithiasis 2.0 cm right ovarian cyst - appears mildly complex mesenteric panniculitis  8/3/23 lower pole right renal stone - 5 mm normal left kidney and simple right renal cyst normal bladder sonogram gallstones [Urinary Urgency] : no urinary urgency [Urinary Frequency] : no urinary frequency [Nocturia] : no nocturia [Straining] : no straining [Weak Stream] : no weak stream [Intermittency] : no intermittency [Dysuria] : dysuria [Hematuria - Gross] : no gross hematuria [Hematuria - Microscopic] : no microscopic hematuria [Bladder Spasm] : no bladder spasm

## 2024-03-16 LAB
APPEARANCE: CLEAR
BILIRUBIN URINE: NEGATIVE
BLOOD URINE: ABNORMAL
COLOR: YELLOW
GLUCOSE QUALITATIVE U: NEGATIVE MG/DL
KETONES URINE: NEGATIVE MG/DL
LEUKOCYTE ESTERASE URINE: ABNORMAL
NITRITE URINE: NEGATIVE
PH URINE: 5.5
PROTEIN URINE: NEGATIVE MG/DL
SPECIFIC GRAVITY URINE: 1.01
UROBILINOGEN URINE: 0.2 MG/DL

## 2024-03-21 ENCOUNTER — APPOINTMENT (OUTPATIENT)
Dept: RHEUMATOLOGY | Facility: CLINIC | Age: 64
End: 2024-03-21
Payer: MEDICARE

## 2024-03-21 ENCOUNTER — APPOINTMENT (OUTPATIENT)
Dept: PAIN MANAGEMENT | Facility: CLINIC | Age: 64
End: 2024-03-21

## 2024-03-21 VITALS
BODY MASS INDEX: 31.28 KG/M2 | HEART RATE: 79 BPM | OXYGEN SATURATION: 95 % | WEIGHT: 170 LBS | SYSTOLIC BLOOD PRESSURE: 145 MMHG | HEIGHT: 62 IN | DIASTOLIC BLOOD PRESSURE: 85 MMHG

## 2024-03-21 VITALS
HEART RATE: 81 BPM | BODY MASS INDEX: 31.28 KG/M2 | DIASTOLIC BLOOD PRESSURE: 94 MMHG | OXYGEN SATURATION: 98 % | TEMPERATURE: 98.3 F | RESPIRATION RATE: 15 BRPM | HEIGHT: 62 IN | SYSTOLIC BLOOD PRESSURE: 154 MMHG | WEIGHT: 170 LBS

## 2024-03-21 DIAGNOSIS — Z78.9 OTHER SPECIFIED HEALTH STATUS: ICD-10-CM

## 2024-03-21 PROCEDURE — 99215 OFFICE O/P EST HI 40 MIN: CPT

## 2024-03-21 PROCEDURE — 99204 OFFICE O/P NEW MOD 45 MIN: CPT

## 2024-03-21 RX ORDER — POTASSIUM CITRATE 15 MEQ/1
15 MEQ TABLET, EXTENDED RELEASE ORAL
Qty: 60 | Refills: 11 | Status: DISCONTINUED | COMMUNITY
Start: 2023-11-10 | End: 2024-03-21

## 2024-03-21 RX ORDER — SULFAMETHOXAZOLE AND TRIMETHOPRIM 800; 160 MG/1; MG/1
800-160 TABLET ORAL
Qty: 10 | Refills: 0 | Status: DISCONTINUED | COMMUNITY
Start: 2021-03-14 | End: 2024-03-21

## 2024-03-21 RX ORDER — COLCHICINE 0.6 MG/1
0.6 TABLET ORAL
Qty: 90 | Refills: 1 | Status: ACTIVE | COMMUNITY
Start: 2024-03-21 | End: 1900-01-01

## 2024-03-21 NOTE — CONSULT LETTER
[Dear  ___] : Dear  [unfilled], [Consult Closing:] : Thank you very much for allowing me to participate in the care of this patient.  If you have any questions, please do not hesitate to contact me. [Consult Letter:] : I had the pleasure of evaluating your patient, [unfilled]. [Sincerely,] : Sincerely, [FreeTextEntry3] : Zay George MD

## 2024-03-21 NOTE — REVIEW OF SYSTEMS
[Joint Pain] : joint pain [Back Pain] : back pain [Decreased ROM] : decreased range of motion [Joint Stiffness] : joint stiffness [Numbness] : numbness [Negative] : Respiratory

## 2024-03-21 NOTE — HISTORY OF PRESENT ILLNESS
[Joint Pain] : joint  [Constant] : constant [FreeTextEntry1] : The patient is a 63 year old female who presents to us for chronic right knee pain. 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 She was evaluated by orthopedic surgery and eventually underwent right knee surgery which resulted in complications and eventually received a total knee arthroplasty. She has undergone 4 knee surgeries and currently Has of chronic right knee pain and swelling and stiffness.   Patient presents today after Right Saphenous nerve block done on 2/23/24. Patient reports improvement in pain symptoms for 2 days after the injection, although the pain had returned afterwards. She continues to have difficult walking, bending, and with inflammation of her right knee. She saw her rheumatologist and was prescribed colchicine for her joint inflammation. She was notified that the SPRINT SPR was approved by her insurance and shed like to discuss this further today.

## 2024-03-21 NOTE — HISTORY OF PRESENT ILLNESS
[FreeTextEntry1] : [place]  [id number]  64 y/o F here for consultation  Pt reports having 4 surgeries in her R knee. She has persistent pain. Pt says a knee aspiration was done, and "gout was found".   R knee pain has been going on for 3 years. Pt says it is worse in pm. Pt says activities make pain worse.  Pt reports stiffness, swelling.  reports whole leg gets swelling, hurts when walking    Pt has tried tylenol and steroids; pt says this did not help  Pt has gone to pain management, and receiving epidurals for back pain? Pt was given intramusuclar injection? Pt says that this help for 3-4 days. Pt was told to put a device in her back for her nerves?   No fevers, h/a, rashes, hair loss, oral ulcers, epistaxis, sinusitis,  swollen glands, dry mouth, dry eyes, CP, SOB, cough, vision changes, abdominal pain, GERD, n/v/d, blood in stool or urine, focal weakness, sensory loss,  Raynaud's, weight loss.

## 2024-03-21 NOTE — PHYSICAL EXAM
[General Appearance - Well Developed] : well developed [General Appearance - Well Nourished] : well nourished [Sclera] : the sclera and conjunctiva were normal [Auscultation Breath Sounds / Voice Sounds] : lungs were clear to auscultation bilaterally [Heart Rate And Rhythm] : heart rate was normal and rhythm regular [Heart Sounds] : normal S1 and S2 [Murmurs] : no murmurs [Abdomen Tenderness] : non-tender [] : no hepato-splenomegaly [FreeTextEntry1] : mild erythema of R leg below knee  [Oriented To Time, Place, And Person] : oriented to person, place, and time

## 2024-03-21 NOTE — ASSESSMENT
[FreeTextEntry1] : 63 year old female presents with chronic right knee pain after work related slip and fall. She has had multiple knee surgeries with complications. She continues to presents with chronic stiffness and pain in her right knee. She has been evaluated for infection and other etiologies. 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 Insurance has denied a LESI for low back pain and has approved PNS for right saphenous nerve. Discussed SPRINT PNS system and patient wishes to proceed.   The potential benefits as well as rare but possible risks were reviewed with the patient.  These risks including infection including epidural abscess, meningitis, osteomyelitis, and discitis, bleeding including epidural hematoma, nerve injury, paralysis, failure to relieve pain or worse pain, headache, pneumothorax, elevated blood sugars, allergic reactions, adverse reactions to medications, vasovagal reactions, falls, etc. Following that discussion, we decided together that the potential benefits outweigh the potential risks and we decided to proceed with scheduling the procedure.  I answered all the patient's questions about the procedure including the technical details of the procedure and also offered that if any other questions arise we will also be happy to answer those on the day of the procedure. All questions today were answered to the patients satisfaction, and the patient stated their verbal understanding.    Plan: - Plan or SPRINT PNS of right saphenous nerve - Patient was given medical order form to bring to PCP for medical clearance. She discussed with office  and will be in touch with patient for scheduling.

## 2024-03-21 NOTE — PHYSICAL EXAM
[Normal] : Well developed, in no acute distress, alert and oriented to person, place and time [Normal muscle bulk without asymmetry] : normal muscle bulk without asymmetry [Spinous Process Tenderness] : no spinous process tenderness [Facet Tenderness] : no facet tenderness [Paraspinal Tenderness] : no paraspinal tenderness [Ataxic] : ataxic [Antalgic] : antalgic [Walker] : ambulates with walker [LE] : Sensory: Intact in bilateral lower extremities [Patellar] : patellar 2+ and symmetric bilaterally [Achilles] : Achilles 2+ and symmetric bilaterally [de-identified] : Limited range of motion of right knee [de-identified] : Patient sitting with gith leg/knee elevated and extended.

## 2024-03-21 NOTE — DATA REVIEWED
[FreeTextEntry1] : Labs reviewed from 1/24 WBC 10.73 ESR, CRP wnl   synovial fluid WBC 59; RBC 1000, N 2% extracellular CPPD crystals  joint PCR, gram stain cx negative  CT knee 2/24 status post interval revision of total knee replacement, no evidence of harware loosening, mod joint effusion

## 2024-03-21 NOTE — ASSESSMENT
[FreeTextEntry1] : 62 y/o F w persistent R knee pain post TKR/multiple revisions =pain in R knee, worse w activities =stiffness; swelling of entire leg below knee =Labs reviewed from 1/24 ESR, CRP wnl =synovial fluid R knee non inflammatory w extracellular CPPD crystals; infectious work up negative =CT knee 2/24 status post interval revision of total knee replacement, no evidence of hardware loosening, mod joint effusion  Explained pseudogout/CPPD arthropathy, that it is a inflammatory arthritis, that happens in episodes, but it is usually not frequent. In most cases it is idiopathic. It can also be found 2/2 to hypothyroidism, hypothyroidism, hypomagnesemia, hypophosphatemia. It is also commonly found w OA. It is treated either w NSAIDs, steroids, local GC injections.   I do not think pt has pseudogout, CPPD arthropathy. Usually condition is acute and episodic, pt's symptoms is chronic. Synovial fluid in non inflammatory. Pt's CPPD crystals are extracellular, and not intracellular. There are rare cases that CPPD arthropathy presents likely chronic condition. Will try a trial of colchicine, given colchicine is relatively low risk medication. If not improvement in 4-6 weeks, will d/c colchicine.   Counseled patient on side effects of colchicine. Most common side effect is GI upset, loose stool to diarrhea. Less common side effects are neuropathy, myopathy, cytopenias.  Pt does have skin changes and swelling of RLE which brings into question complex regional pain syndrome. However, pt says her leg only hurts w walking. CRPS pain is persistent. Unlikely she has this condition.   Plan -colchicine 0.6mg daily  RTO depending on above results  4-6 weeks

## 2024-04-15 ENCOUNTER — APPOINTMENT (OUTPATIENT)
Age: 64
End: 2024-04-15
Payer: MEDICARE

## 2024-04-15 ENCOUNTER — TRANSCRIPTION ENCOUNTER (OUTPATIENT)
Age: 64
End: 2024-04-15

## 2024-04-15 PROCEDURE — 64555 IMPLANT NEUROELECTRODES: CPT

## 2024-04-18 ENCOUNTER — APPOINTMENT (OUTPATIENT)
Dept: RHEUMATOLOGY | Facility: CLINIC | Age: 64
End: 2024-04-18
Payer: MEDICARE

## 2024-04-18 PROCEDURE — 99442: CPT

## 2024-04-23 ENCOUNTER — APPOINTMENT (OUTPATIENT)
Dept: PAIN MANAGEMENT | Facility: CLINIC | Age: 64
End: 2024-04-23

## 2024-04-23 PROCEDURE — 95971 ALYS SMPL SP/PN NPGT W/PRGRM: CPT | Mod: 58

## 2024-04-23 PROCEDURE — 99024 POSTOP FOLLOW-UP VISIT: CPT

## 2024-04-23 NOTE — PHYSICAL EXAM
[de-identified] : PNS device in good condition and functional. Old dressing removed, and skin noted to be clean and dry. No erythema, edema, or other signs of infection. New dressing applied by SPR rep.

## 2024-04-23 NOTE — ASSESSMENT
[FreeTextEntry1] : 63 year old female presents with chronic right knee pain after work related slip and fall. She has had multiple knee surgeries with complications. She continues to presents with chronic stiffness and pain in her right knee. She has been evaluated for infection and other etiologies. 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 She is s/p PNS to the right saphenous nerve   Plan: - SPR rep completed device check and confirmed adequate usage. Dressing was changed. Rep will contact patient weekly - patient encouraged to complete ROM exercises at home to help with decreasing swelling. in knee. - follow up in 4 weeks

## 2024-04-23 NOTE — HISTORY OF PRESENT ILLNESS
[FreeTextEntry1] : 63-year-old female known to our clinic who presents for follow-up after the placement of a SPR PNS device to the right saphenous nerve due to chronic knee pain from a work-related slip and fall. The SPR representative was present during the interview. The patient reports overall improvement, with a reduction in knee pain. However, she continues to experience some swelling and slight color changes in the affected area

## 2024-05-06 ENCOUNTER — NON-APPOINTMENT (OUTPATIENT)
Age: 64
End: 2024-05-06

## 2024-05-17 ENCOUNTER — APPOINTMENT (OUTPATIENT)
Dept: UROLOGY | Facility: CLINIC | Age: 64
End: 2024-05-17
Payer: MEDICARE

## 2024-05-17 VITALS
HEART RATE: 74 BPM | RESPIRATION RATE: 14 BRPM | OXYGEN SATURATION: 95 % | SYSTOLIC BLOOD PRESSURE: 142 MMHG | DIASTOLIC BLOOD PRESSURE: 84 MMHG

## 2024-05-17 DIAGNOSIS — N39.0 URINARY TRACT INFECTION, SITE NOT SPECIFIED: ICD-10-CM

## 2024-05-17 DIAGNOSIS — N20.0 CALCULUS OF KIDNEY: ICD-10-CM

## 2024-05-17 PROCEDURE — 99214 OFFICE O/P EST MOD 30 MIN: CPT

## 2024-05-17 RX ORDER — FLUCONAZOLE 100 MG/1
100 TABLET ORAL DAILY
Qty: 1 | Refills: 0 | Status: ACTIVE | COMMUNITY
Start: 2024-05-17 | End: 1900-01-01

## 2024-05-17 RX ORDER — CEFUROXIME AXETIL 250 MG/1
250 TABLET ORAL
Qty: 20 | Refills: 0 | Status: ACTIVE | COMMUNITY
Start: 2024-05-17 | End: 1900-01-01

## 2024-05-17 NOTE — ASSESSMENT
[FreeTextEntry1] : 64 yo here for follow up - suspects she has a UTI and vaginal yeast infection - she was originally seen with self limited bothersome LUTS - following a knee replacement she states her symptoms have resolved  but she did have a stone in her right kidney - and as such will require a workup  prior when she had symptoms I failed to identify a source of infection  Urine culture - 11/11/2023 E. Coli - > 100,000  8/2/23 CT scan with and without contrast  nonobstructing right renal calculus cholelithiasis 2.0 cm right ovarian cyst - appears mildly complex mesenteric panniculitis  8/3/23 lower pole right renal stone - 5 mm normal left kidney and simple right renal cyst normal bladder sonogram gallstones  Plan  64 yo with suspected UTI and nephrolithiasis culture obtained today  - will treat UTI  - Diflucan - cefuroxime  - CT scan A/P stone protocol - f/u with Dr. Cantu to manage the stone     [Urinary Tract Infection (599.0\N39.0)] : qualitative ~C was positive

## 2024-05-17 NOTE — HISTORY OF PRESENT ILLNESS
[FreeTextEntry1] : 62 yo here for follow up - suspects she has a UTI and vaginal yeast infection - she was originally seen with self limited bothersome LUTS - following a knee replacement she states her symptoms have resolved  but she did have a stone in her right kidney - and as such will require a workup  prior when she had symptoms I failed to identify a source of infection  Urine culture - 11/11/2023 E. Coli - > 100,000  8/2/23 CT scan with and without contrast  nonobstructing right renal calculus cholelithiasis 2.0 cm right ovarian cyst - appears mildly complex mesenteric panniculitis  8/3/23 lower pole right renal stone - 5 mm normal left kidney and simple right renal cyst normal bladder sonogram gallstones  she feels burning with urination on the inside of her bladder and her perineum [Urinary Urgency] : no urinary urgency [Urinary Frequency] : no urinary frequency [Nocturia] : no nocturia [Straining] : no straining [Weak Stream] : no weak stream [Intermittency] : no intermittency [Dysuria] : dysuria [Hematuria - Gross] : no gross hematuria [Hematuria - Microscopic] : no microscopic hematuria [Bladder Spasm] : no bladder spasm

## 2024-05-21 ENCOUNTER — APPOINTMENT (OUTPATIENT)
Dept: PAIN MANAGEMENT | Facility: CLINIC | Age: 64
End: 2024-05-21

## 2024-05-21 DIAGNOSIS — Z96.659 OTHER SPECIFIED COMPLICATION OF INTERNAL ORTHOPEDIC PROSTHETIC DEVICES, IMPLANTS AND GRAFTS, SUBSEQUENT ENCOUNTER: ICD-10-CM

## 2024-05-21 DIAGNOSIS — M25.669 OTHER SPECIFIED COMPLICATION OF INTERNAL ORTHOPEDIC PROSTHETIC DEVICES, IMPLANTS AND GRAFTS, SUBSEQUENT ENCOUNTER: ICD-10-CM

## 2024-05-21 DIAGNOSIS — T84.89XD OTHER SPECIFIED COMPLICATION OF INTERNAL ORTHOPEDIC PROSTHETIC DEVICES, IMPLANTS AND GRAFTS, SUBSEQUENT ENCOUNTER: ICD-10-CM

## 2024-05-21 PROCEDURE — 99214 OFFICE O/P EST MOD 30 MIN: CPT

## 2024-05-28 ENCOUNTER — APPOINTMENT (OUTPATIENT)
Dept: UROLOGY | Facility: CLINIC | Age: 64
End: 2024-05-28
Payer: MEDICARE

## 2024-05-28 VITALS
RESPIRATION RATE: 14 BRPM | WEIGHT: 174 LBS | OXYGEN SATURATION: 95 % | BODY MASS INDEX: 32.02 KG/M2 | HEIGHT: 62 IN | HEART RATE: 78 BPM | DIASTOLIC BLOOD PRESSURE: 91 MMHG | SYSTOLIC BLOOD PRESSURE: 154 MMHG

## 2024-05-28 DIAGNOSIS — R30.0 DYSURIA: ICD-10-CM

## 2024-05-28 LAB
APPEARANCE: CLEAR
BACTERIA: NEGATIVE /HPF
BILIRUBIN URINE: NEGATIVE
BLOOD URINE: ABNORMAL
CAST: 0 /LPF
COLOR: YELLOW
EPITHELIAL CELLS: 6 /HPF
GLUCOSE QUALITATIVE U: NEGATIVE MG/DL
KETONES URINE: NEGATIVE MG/DL
LEUKOCYTE ESTERASE URINE: ABNORMAL
MICROSCOPIC-UA: NORMAL
NITRITE URINE: NEGATIVE
PH URINE: 6
PROTEIN URINE: NEGATIVE MG/DL
RED BLOOD CELLS URINE: 1 /HPF
SPECIFIC GRAVITY URINE: 1.01
UROBILINOGEN URINE: 0.2 MG/DL
WHITE BLOOD CELLS URINE: 7 /HPF

## 2024-05-28 PROCEDURE — 81003 URINALYSIS AUTO W/O SCOPE: CPT | Mod: QW

## 2024-05-28 PROCEDURE — 99214 OFFICE O/P EST MOD 30 MIN: CPT

## 2024-05-28 RX ORDER — ESTRADIOL 0.1 MG/G
0.1 CREAM VAGINAL
Qty: 1 | Refills: 5 | Status: ACTIVE | COMMUNITY
Start: 2024-05-28 | End: 1900-01-01

## 2024-05-28 NOTE — HISTORY OF PRESENT ILLNESS
[FreeTextEntry1] :   JEREMI TIJERINA Aug  8 1960   Language: English  Date of First visit: 05/28/2024 Accompanied by: self  Contact info:  Referring Provider/PCP:  Fax:    CC/ Problem List: nephrolithiasis dysuria =============================================================================== FIRST VISIT / Summary: Very pleasant 63 year old F here for follow up - suspects she has a UTI and vaginal yeast infection - she was originally seen with self limited bothersome LUTS - following a knee replacement she states her symptoms have returned (prior had resolved but now for at least a few weeks of symptoms again.   She did have a stone in her right kidney - and as such will require a workup  prior when she had symptoms I failed to identify a source of infection Urine culture - 11/11/2023 E. Coli - > 100,000  8/2/23 CT scan with and without contrast  nonobstructing right renal calculus cholelithiasis 2.0 cm right ovarian cyst - appears mildly complex mesenteric panniculitis  8/3/23 lower pole right renal stone - 5 mm normal left kidney and simple right renal cyst normal bladder sonogram gallstones  she feels burning with urination on the inside of her bladder and her perineum   Patient is currently experiencing dysuria, but no urinary urgency, no urinary frequency, no nocturia, no straining, no weak stream, no intermittency, no gross hematuria, no microscopic hematuria and no bladder spasm.   ------------------------------------------------------------------------------------------- INTERVAL VISITS:   ===============================================================================   PMH:  Meds:  All:  FHx:  SocHx:    PSH:    ROS: Review of Systems is as per HPI unless otherwise denoted below   =============================================================================== DATA: LABS (SELECTED):---------------------------------------------------------------------------------------------------     RADS:------------------------------------------------------------------------------------------------------------------- 8/3/23 CT noncontrast: lower pole right renal stone - 5 mm. gallstones 5/22/24: CT noncontrast: right lower pole renal stone - 5mm still and ?somewhat intraparenchymal  PATHOLOGY/CYTOLOGY:-------------------------------------------------------------------------------------------     VOIDING STUDIES: ----------------------------------------------------------------------------------------------------     STONE STUDIES: (Analysis/LLSA)----------------------------------------------------------------------------------     PROCEDURES: -----------------------------------------------------------------------------------------------       =============================================================================== PHYSICAL EXAM:    FOCUSED: ----------------------------------------------------------------------------------------------------------------     ======================================================================================= DISCUSSION: ======================================================================================= ASSESSMENT and PLAN   1. LUTS - s/p cefuroxime and diflucan without improvement 2 weeks ago - rec urine studies before additional abx - UA with trace blood and LE otherwise ok - rec estrace cream and few days of azo   2. Nephrolithiasis - recommend observation - has not changed in almost 1 year and lower pole - she is in agreement - goal 3L liquids daily    =======================================================================================  4 The patient's imaging study prior to this visit was personally reviewed and the above is my independent interpretation for the  organ systems.   Thank you for allowing me to assist in the care of your patient. Should you have any questions please do not hesitate to reach out to me.     Jourdan Cantu MD                                                        [facility] Physician [facility]   [place] Office: [address] [facility], [address] [place] [address] T: [phone] F: [phone]   [place] Office: [address], 1st [place] [address] T: [phone] F: [phone]

## 2024-05-29 PROBLEM — M51.26 LUMBAR HERNIATED DISC: Status: ACTIVE | Noted: 2024-02-16

## 2024-05-29 PROBLEM — M54.16 LUMBAR RADICULOPATHY, CHRONIC: Status: ACTIVE | Noted: 2024-02-23

## 2024-05-29 NOTE — ASSESSMENT
[FreeTextEntry1] : The patient has failed rest, activity modification, 6 weeks of physician directed physical therapy, NSAIDs, muscle relaxants, and neuropathic medications.  They have also failed acupuncture, chiropractic care, and surgical evaluation. At this point an interventional therapy targeted at alleviating their pain and improving their functionality is a reasonable treatment option.  The potential benefits as well as rare but possible risks were reviewed with the patient.  These risks including infection, abscess, osteomyelitis, and bleeding including  hematoma, nerve injury, paralysis, failure to relieve pain or worse pain was discussed with the patient.  All questions were answered via .  - Right Saphenous Nerve Block today  right, saphenous nerve block under ultrasound guided      The potential benefits as well as rare but possible risks were reviewed with the patient.  These risks including infection including infection, abscess, bleeding including hematoma, nerve injury, failure to relieve pain or worse pain, elevated blood sugars, allergic reactions, adverse reactions to medications, vasovagal reactions, falls, etc. Following that discussion, all questions were again answered to the patients satisfaction, the patient stated their verbal understanding and written consent was obtained.      After obtaining consent, pre-procedure blood pressure and heart rate were stable and recorded in the nursing record. Standard monitors were applied.The patient remained in ander setaed position with her right foot rested on a stand stool. The area overlying the peripheral nerve was widely prepped with chloroprep and sterilely draped.     Using ultrasound, the appropriate nerve and landmarks were identified. The skin overlying the target was anesthetized with 1% lidocaine. A 22 gauge 10 cm radiofrequency needle was advanced under ultrasound guidance to around the nerve.   Aspiration was negative for heme, air, and CSF 4 cc of 0.25% Bupivacaine and  Dexamethasone 10 mg, was injected intermittently and spread verified around the targeted nerve.     The needle was removed, skin cleansed and a sterile bandage was applied. The patient tolerated the procedure well and no complications were encountered. Following the procedure, the patient's vital signs were stable. The patient was discharged home in good condition with post-procedural instructions.     Time Out: Immediately prior to the procedure, the following was verbally confirmed that there is a consent form and that the correct patient, planned procedure, site and side are consistent with documentation and that necessary equipment are available prior to the start of the case.     Complications: none  EBL: <5 cc

## 2024-05-29 NOTE — PHYSICAL EXAM
[de-identified] :   Constitutional: Well developed, in no acute distress, alert and oriented to person, place and time.   Respiratory: Non-labored breathing, no audible wheezes.   Cardiac: Regular, no tachycardia. Musculoskeletal:   Inspection: normal muscle bulk without asymmetry.   Palpation: no spinous process tenderness, no facet tenderness and no paraspinal tenderness.   Range of Motion: within functional limits and without pain.   Gait: ataxic, antalgic and ambulates with walker.   Special Tests: positive straight leg raise, but negative Gurrola's Sign.   Neurovascular: 5/5 motor strength in bilateral lower extremities. Sensory: Intact in bilateral lower extremities. DTRs: patellar 2+ and symmetric bilaterally and Achilles 2+ and symmetric bilaterally.   Skin: Skin color, texture, turgor normal, no rashes or lesions in the four extremities and back . Edema of RLE. Post surgical scar. No erythema.

## 2024-05-29 NOTE — HISTORY OF PRESENT ILLNESS
[FreeTextEntry1] : The patient is a 63 year old female who presents to us for chronic right knee pain. 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 She was evaluated by orthopedic surgery and eventually underwent right knee surgery which resulted in complications and eventually received a total knee arthroplasty. She has undergone 4 knee surgeries and currently Has of chronic right knee pain and swelling and stiffness. She is referred for interventional pain options. Patient denies any new numbness, tingling, balance issues, bowel incontinence, bladder incontinence, or saddle anesthesia.   JEREMI TIJERINA presents today with complaints of back pain and neck pain. Location of pain: Right knee pain. Right > left lower back pain, radiating down posterior/lateral aspect of her leg. Pain Onset: 1 year(s) ago.    She presents today to discuss treatment options.

## 2024-05-30 PROBLEM — T84.89XD POSTOPERATIVE STIFFNESS OF TOTAL KNEE REPLACEMENT, SUBSEQUENT ENCOUNTER: Status: ACTIVE | Noted: 2024-01-16

## 2024-05-30 LAB — BACTERIA UR CULT: NORMAL

## 2024-05-30 NOTE — HISTORY OF PRESENT ILLNESS
[FreeTextEntry1] : 63-year-old female known to our clinic who presents for follow-up after the placement of a SPR PNS device to the right saphenous nerve due to chronic knee pain from a work-related slip and fall. The SPR representative was present during the interview and reports her usage is adequate, averaging 24 hours at ~ 40 intensity.  The patient reports no improvement in her symptoms.  Pain is located on anterior right knee and limits her ability to ambulate and stand. Patient uses a walker to assist her with walking.  continues to experience some swelling and slight color changes in the affected area

## 2024-05-30 NOTE — PHYSICAL EXAM
[Spinous Process Tenderness] : no spinous process tenderness [Sacroiliac tenderness] : no sacroiliac tenderness [Antalgic] : antalgic [Walker] : ambulates with walker [Gurrola's Sign] : negative Gurrola's Sign [GUILLERMO Test] : negative GUILLERMO Test (Scott's Test) [] : Sensory: [de-identified] : edematous right knee, no erythema, warmth or other signs of infection. Limited ROM due to pain and swelling

## 2024-05-30 NOTE — ASSESSMENT
[FreeTextEntry1] : 63 year old female presents with chronic right knee pain after work related slip and fall. She has had multiple knee surgeries with complications. She continues to presents with chronic stiffness and pain in her right knee. She has been evaluated for infection and other etiologies. 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 She is s/p PNS to the right saphenous nerve  Plan: - SPR rep completed device check and confirmed adequate usage. Dressing was changed. Rep will contact patient weekly - patient encouraged to complete ROM exercises at home to help with decreasing swelling. in knee. - discussed SCS trial after removal of PNS. Patient is still contemplating.  - follow up in 3 weeks for removal

## 2024-06-05 ENCOUNTER — NON-APPOINTMENT (OUTPATIENT)
Age: 64
End: 2024-06-05

## 2024-06-05 NOTE — HISTORY OF PRESENT ILLNESS
[FreeTextEntry1] : spoke with patient  reviewed CT- renal stones stable and reviewed UCx-negative patient verbalized understanding and appreciated the call

## 2024-06-20 ENCOUNTER — APPOINTMENT (OUTPATIENT)
Dept: PAIN MANAGEMENT | Facility: CLINIC | Age: 64
End: 2024-06-20

## 2024-06-20 DIAGNOSIS — Z96.651 PRESENCE OF RIGHT ARTIFICIAL KNEE JOINT: ICD-10-CM

## 2024-06-20 DIAGNOSIS — M25.561 PAIN IN RIGHT KNEE: ICD-10-CM

## 2024-06-20 DIAGNOSIS — G57.71 CAUSALGIA OF RIGHT LOWER LIMB: ICD-10-CM

## 2024-06-20 PROCEDURE — 99215 OFFICE O/P EST HI 40 MIN: CPT

## 2024-06-20 NOTE — REVIEW OF SYSTEMS
[Back Pain] : back pain [Muscle Pain] : no muscle pain [Decreased ROM] : decreased range of motion [Numbness] : no numbness [Negative] : Constitutional

## 2024-06-20 NOTE — PHYSICAL EXAM
[Normal] : Well developed, in no acute distress, alert and oriented to person, place and time [Antalgic] : not antalgic [Walker] : ambulates with walker [de-identified] : right knee decreased range of motion due to edema and pain. Moderate pain on palpation. No warm, erythema or other signs of infection. Well-healed scar from prior knee replacement.

## 2024-06-20 NOTE — ASSESSMENT
[FreeTextEntry1] : 63 year old female presents with chronic right knee pain after work related slip and fall. She has had multiple knee surgeries with complications. She continues to presents with chronic stiffness and pain in her right knee. She has been evaluated for infection and other etiologies. 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 She is s/p PNS to the right saphenous nerve  Plan: - PNS lead was removed. Area was cleaned with alcohol and bandaid was applied. - representation unable to check usage and intensity because patient's battery was dead.  - patient wants a second opinion. Does not want to do DRG trial - follow up as needed

## 2024-06-20 NOTE — HISTORY OF PRESENT ILLNESS
[FreeTextEntry1] : History obtained via interpretation services. Tona : Adrienne [id number]  Patient is known to our clinic and presents for follow up and removal of SPR PNS lead to right saphenous nerve   SPR representative was present during this visit and was unable to evaluate usage due to her battery being dead. The patient reports no improvement in her symptoms. Pain is located to both anterior and posterior right knee. Per patient, the pain limits her ability to ambulate, stand as well as sit.  Patient uses a walker to assist her with walking. She continues to experience some swelling and slight color changes in the affected area.

## 2024-07-15 NOTE — PATIENT PROFILE ADULT - FALL HARM RISK - HARM RISK INTERVENTIONS
Spoke with  he will need to be rescheduled due to taking iron pill, eating today.  Assistance with ambulation/Assistance OOB with selected safe patient handling equipment/Communicate Risk of Fall with Harm to all staff/Discuss with provider need for PT consult/Monitor gait and stability/Provide patient with walking aids - walker, cane, crutches/Reinforce activity limits and safety measures with patient and family/Tailored Fall Risk Interventions/Visual Cue: Yellow wristband and red socks/Bed in lowest position, wheels locked, appropriate side rails in place/Call bell, personal items and telephone in reach/Instruct patient to call for assistance before getting out of bed or chair/Non-slip footwear when patient is out of bed/[place] to call system/Physically safe environment - no spills, clutter or unnecessary equipment/Purposeful Proactive Rounding/Room/bathroom lighting operational, light cord in reach

## 2024-09-19 ENCOUNTER — APPOINTMENT (OUTPATIENT)
Dept: ORTHOPEDIC SURGERY | Facility: CLINIC | Age: 64
End: 2024-09-19
Payer: OTHER MISCELLANEOUS

## 2024-09-19 DIAGNOSIS — T84.89XD OTHER SPECIFIED COMPLICATION OF INTERNAL ORTHOPEDIC PROSTHETIC DEVICES, IMPLANTS AND GRAFTS, SUBSEQUENT ENCOUNTER: ICD-10-CM

## 2024-09-19 DIAGNOSIS — M25.669 OTHER SPECIFIED COMPLICATION OF INTERNAL ORTHOPEDIC PROSTHETIC DEVICES, IMPLANTS AND GRAFTS, SUBSEQUENT ENCOUNTER: ICD-10-CM

## 2024-09-19 DIAGNOSIS — Z96.659 OTHER SPECIFIED COMPLICATION OF INTERNAL ORTHOPEDIC PROSTHETIC DEVICES, IMPLANTS AND GRAFTS, SUBSEQUENT ENCOUNTER: ICD-10-CM

## 2024-09-19 PROCEDURE — 99214 OFFICE O/P EST MOD 30 MIN: CPT

## 2024-09-19 RX ORDER — CELECOXIB 200 MG/1
200 CAPSULE ORAL
Qty: 30 | Refills: 0 | Status: ACTIVE | COMMUNITY
Start: 2024-09-19 | End: 1900-01-01

## 2024-09-19 NOTE — PHYSICAL EXAM
[de-identified] : Right knee today range of motion is 0 to 20 degrees.  Any attempt passively to flex beyond 20 degrees is met with mechanical block and moderate discomfort.  No effusion no redness noted.

## 2024-09-19 NOTE — DISCUSSION/SUMMARY
[de-identified] : Patient I discussed that she has essentially no further surgical options available.  Patient did have a open synovectomy partial vision which improved her range of motion during her surgery significantly but thereafter the patient once again developed this arthrofibrosis.  Patient is seen 2 other orthopedic joint specialist who both recommended no further intervention.  Based on the above history patient is now to be considered 100% disabled due to the fact she has essentially minimal motion of the right knee.  Range of motion as stated above 0 to 20 degrees.  Patient has not shown any improvement with conservative measures since her most recent revision and therefore I do not expect any significant improvement over time I believe she has plateaued in terms of the level of improvement.  And otherwise the patient has reached her maximal improvement at this point.  Today's consultation lasted 30 minutes.

## 2024-09-19 NOTE — HISTORY OF PRESENT ILLNESS
[de-identified] : Patient returns today she has had a protracted orthopedic course with the right knee.  Recall patient had arthroscopic surgery initially due to a Workmen's Comp. injury after that surgery patient had progression of her arthritic condition of her knee necessitating knee replacement surgery which was complicated by stiffness/arthrofibrosis.  Despite multiple manipulation under anesthesia as well as a synovectomy open with partial revision of the knee patient continued always to develop stiffness.  CT scan indicated no sleep no evidence of malrotation of the components and workup for infection was always negative.  Patient is here unable to work due to continued stiffness of her knee.

## 2024-09-24 ENCOUNTER — APPOINTMENT (OUTPATIENT)
Age: 64
End: 2024-09-24
Payer: MEDICARE

## 2024-09-24 VITALS
HEART RATE: 90 BPM | BODY MASS INDEX: 31.28 KG/M2 | HEIGHT: 62 IN | TEMPERATURE: 97.9 F | OXYGEN SATURATION: 96 % | SYSTOLIC BLOOD PRESSURE: 123 MMHG | RESPIRATION RATE: 14 BRPM | WEIGHT: 170 LBS | DIASTOLIC BLOOD PRESSURE: 83 MMHG

## 2024-09-24 DIAGNOSIS — N39.0 URINARY TRACT INFECTION, SITE NOT SPECIFIED: ICD-10-CM

## 2024-09-24 DIAGNOSIS — N20.0 CALCULUS OF KIDNEY: ICD-10-CM

## 2024-09-24 DIAGNOSIS — R30.0 DYSURIA: ICD-10-CM

## 2024-09-24 PROCEDURE — G2211 COMPLEX E/M VISIT ADD ON: CPT

## 2024-09-24 PROCEDURE — 99214 OFFICE O/P EST MOD 30 MIN: CPT

## 2024-09-24 NOTE — HISTORY OF PRESENT ILLNESS
[FreeTextEntry1] : JEREMI TIJERINA Aug 8 1960  Language: English Date of First visit: 05/28/2024 Accompanied by: self Contact info: Referring Provider/PCP: Dr. Bennett Fax: [phone]  CC/ Problem List: nephrolithiasis dysuria =============================================================================== FIRST VISIT / Summary: Very pleasant 63 year old F here for follow up - suspects she has a UTI and vaginal yeast infection - she was originally seen with self limited bothersome LUTS - following a knee replacement. she states her symptoms have returned (prior had resolved but now for at least a few weeks of symptoms again.  she feels burning with urination on the inside of her bladder and her perineum  Patient is currently experiencing dysuria, but no urinary urgency, no urinary frequency, no nocturia, no straining, no weak stream, no intermittency, no gross hematuria, no microscopic hematuria and no bladder spasm.  ------------------------------------------------------------------------------------------- INTERVAL VISITS: The patient's medications and allergies were reviewed and edited below. Dated 09/24/2024 She still has dysuria. Using cream as instructed 3x/week without improvement. last urine cx negative.  =============================================================================== FHx: SocHx:  PSH:  ROS: Review of Systems is as per HPI unless otherwise denoted below  =============================================================================== DATA: LABS (SELECTED):--------------------------------------------------------------------------------------------------- 11/11/2023 UCx : E. Coli - > 100,000 3/2024: UCx E faecalis 5/28/2024: UCX negative  RADS:------------------------------------------------------------------------------------------------------------------- 8/3/23 CT noncontrast: lower pole right renal stone - 5 mm. gallstones 5/22/24: CT noncontrast: right lower pole renal stone - 5mm still and ?somewhat intraparenchymal  PATHOLOGY/CYTOLOGY:-------------------------------------------------------------------------------------------   VOIDING STUDIES: ----------------------------------------------------------------------------------------------------   STONE STUDIES: (Analysis/LLSA)----------------------------------------------------------------------------------   PROCEDURES: -----------------------------------------------------------------------------------------------    =============================================================================== PHYSICAL EXAM:   FOCUSED: ---------------------------------------------------------------------------------------------------------------- Date: 09/24/2024 Chaperone:   External Genitalia: Normal Urethral meatus: orthotopic, no palpable mass or fluctuance. no pain or itching. States she is applying estrogen cream at meatus. Pelvic Support: no significant cystocele, rectocele, or apical prolapse at rest or with valsalva. No stress urinary incontinence demonstrated.   ======================================================================================= DISCUSSION: ======================================================================================= ASSESSMENT and PLAN  1. LUTS - s/p cefuroxime and diflucan without improvement 2 weeks ago - repeat urine studies before additional abx - stop estrogen - no exam findings of atrophy - consider cystoscopy, she declines today  2. Nephrolithiasis - recommend observation - has not changed in almost 1 year and lower pole - she is in agreement - goal 3L liquids daily  ======================================================================================= The total time personally spent preparing for this visit (reviewing test results, obtaining external history) and during the visit (ordering tests/medications, spent face to face with the patient / family and counseling them on the above), as well as after the visit (on clinical documentation and coordination with other care providers) was approximately 35 minutes.   Thank you for allowing me to assist in the care of your patient. Should you have any questions please do not hesitate to reach out to me.   Jourdan Cantu MD       [facility] Physician [facility]  [place] Office: [address] [facility] [facility], [address] [place] [address] T: [phone] F: [phone]  [place] Office: [address] [place] [address], 1st [place] [address] T: [phone] F: [phone]

## 2024-09-24 NOTE — HISTORY OF PRESENT ILLNESS
[FreeTextEntry1] : JEREMI TIJERINA Aug 8 1960  Language: English Date of First visit: 05/28/2024 Accompanied by: self Contact info: Referring Provider/PCP: Dr. Bennett Fax: [phone]  CC/ Problem List: nephrolithiasis dysuria =============================================================================== FIRST VISIT / Summary: Very pleasant 63 year old F here for follow up - suspects she has a UTI and vaginal yeast infection - she was originally seen with self limited bothersome LUTS - following a knee replacement. she states her symptoms have returned (prior had resolved but now for at least a few weeks of symptoms again.  she feels burning with urination on the inside of her bladder and her perineum  Patient is currently experiencing dysuria, but no urinary urgency, no urinary frequency, no nocturia, no straining, no weak stream, no intermittency, no gross hematuria, no microscopic hematuria and no bladder spasm.  ------------------------------------------------------------------------------------------- INTERVAL VISITS: The patient's medications and allergies were reviewed and edited below. Dated 09/24/2024 She still has dysuria. Using cream as instructed 3x/week without improvement. last urine cx negative.  =============================================================================== FHx: SocHx:  PSH:  ROS: Review of Systems is as per HPI unless otherwise denoted below  =============================================================================== DATA: LABS (SELECTED):--------------------------------------------------------------------------------------------------- 11/11/2023 UCx : E. Coli - > 100,000 3/2024: UCx E faecalis 5/28/2024: UCX negative  RADS:------------------------------------------------------------------------------------------------------------------- 8/3/23 CT noncontrast: lower pole right renal stone - 5 mm. gallstones 5/22/24: CT noncontrast: right lower pole renal stone - 5mm still and ?somewhat intraparenchymal  PATHOLOGY/CYTOLOGY:-------------------------------------------------------------------------------------------   VOIDING STUDIES: ----------------------------------------------------------------------------------------------------   STONE STUDIES: (Analysis/LLSA)----------------------------------------------------------------------------------   PROCEDURES: -----------------------------------------------------------------------------------------------    =============================================================================== PHYSICAL EXAM:   FOCUSED: ---------------------------------------------------------------------------------------------------------------- Date: 09/24/2024 Chaperone:   External Genitalia: Normal Urethral meatus: orthotopic, no palpable mass or fluctuance. no pain or itching. States she is applying estrogen cream at meatus. Pelvic Support: no significant cystocele, rectocele, or apical prolapse at rest or with valsalva. No stress urinary incontinence demonstrated.   ======================================================================================= DISCUSSION: ======================================================================================= ASSESSMENT and PLAN  1. LUTS - s/p cefuroxime and diflucan without improvement 2 weeks ago - repeat urine studies before additional abx - stop estrogen - no exam findings of atrophy - consider cystoscopy, she declines today  2. Nephrolithiasis - recommend observation - has not changed in almost 1 year and lower pole - she is in agreement - goal 3L liquids daily  ======================================================================================= The total time personally spent preparing for this visit (reviewing test results, obtaining external history) and during the visit (ordering tests/medications, spent face to face with the patient / family and counseling them on the above), as well as after the visit (on clinical documentation and coordination with other care providers) was approximately 35 minutes.   Thank you for allowing me to assist in the care of your patient. Should you have any questions please do not hesitate to reach out to me.   Jourdan Cantu MD       [facility] Physician [facility]  [place] Office: [address], [address] [place] [address] T: [phone] F: [phone]  [place] Office: [address] [place] [address], [address]st [place] [address] T: [phone] [phone]

## 2024-09-25 LAB
APPEARANCE: ABNORMAL
BACTERIA: ABNORMAL /HPF
BILIRUBIN URINE: NEGATIVE
BLOOD URINE: ABNORMAL
C TRACH RRNA SPEC QL NAA+PROBE: NOT DETECTED
CAST: 1 /LPF
COLOR: YELLOW
EPITHELIAL CELLS: 14 /HPF
GLUCOSE QUALITATIVE U: NEGATIVE MG/DL
KETONES URINE: NEGATIVE MG/DL
LEUKOCYTE ESTERASE URINE: ABNORMAL
MICROSCOPIC-UA: NORMAL
N GONORRHOEA RRNA SPEC QL NAA+PROBE: NOT DETECTED
NITRITE URINE: POSITIVE
PH URINE: 5.5
PROTEIN URINE: NEGATIVE MG/DL
RED BLOOD CELLS URINE: 2 /HPF
REVIEW: NORMAL
SOURCE AMPLIFICATION: NORMAL
SPECIFIC GRAVITY URINE: 1.01
UROBILINOGEN URINE: 0.2 MG/DL
WHITE BLOOD CELLS URINE: 55 /HPF

## 2024-09-27 LAB — BACTERIA UR CULT: ABNORMAL

## 2024-09-27 RX ORDER — SULFAMETHOXAZOLE AND TRIMETHOPRIM 800; 160 MG/1; MG/1
800-160 TABLET ORAL TWICE DAILY
Qty: 6 | Refills: 0 | Status: ACTIVE | COMMUNITY
Start: 2024-09-27 | End: 1900-01-01

## 2024-10-08 ENCOUNTER — APPOINTMENT (OUTPATIENT)
Age: 64
End: 2024-10-08
Payer: MEDICARE

## 2024-10-08 VITALS
RESPIRATION RATE: 14 BRPM | WEIGHT: 170 LBS | TEMPERATURE: 97.8 F | SYSTOLIC BLOOD PRESSURE: 134 MMHG | HEIGHT: 62 IN | DIASTOLIC BLOOD PRESSURE: 84 MMHG | OXYGEN SATURATION: 95 % | BODY MASS INDEX: 31.28 KG/M2 | HEART RATE: 89 BPM

## 2024-10-08 DIAGNOSIS — R30.0 DYSURIA: ICD-10-CM

## 2024-10-08 DIAGNOSIS — R39.9 UNSPECIFIED SYMPTOMS AND SIGNS INVOLVING THE GENITOURINARY SYSTEM: ICD-10-CM

## 2024-10-08 PROCEDURE — G2211 COMPLEX E/M VISIT ADD ON: CPT

## 2024-10-08 PROCEDURE — 99214 OFFICE O/P EST MOD 30 MIN: CPT

## 2024-10-08 PROCEDURE — 81003 URINALYSIS AUTO W/O SCOPE: CPT | Mod: QW

## 2024-10-08 RX ORDER — NYSTATIN 100000 [USP'U]/G
100000 CREAM TOPICAL 3 TIMES DAILY
Qty: 2 | Refills: 5 | Status: ACTIVE | COMMUNITY
Start: 2024-10-08 | End: 1900-01-01

## 2024-10-10 LAB — BACTERIA UR CULT: NORMAL

## 2024-11-05 ENCOUNTER — APPOINTMENT (OUTPATIENT)
Dept: ORTHOPEDIC SURGERY | Facility: CLINIC | Age: 64
End: 2024-11-05
Payer: OTHER MISCELLANEOUS

## 2024-11-05 PROCEDURE — 99214 OFFICE O/P EST MOD 30 MIN: CPT

## 2024-11-06 ENCOUNTER — APPOINTMENT (OUTPATIENT)
Dept: ORTHOPEDIC SURGERY | Facility: CLINIC | Age: 64
End: 2024-11-06
Payer: OTHER MISCELLANEOUS

## 2024-11-06 VITALS
HEART RATE: 92 BPM | OXYGEN SATURATION: 97 % | BODY MASS INDEX: 31.28 KG/M2 | HEIGHT: 62 IN | WEIGHT: 170 LBS | SYSTOLIC BLOOD PRESSURE: 125 MMHG | DIASTOLIC BLOOD PRESSURE: 78 MMHG

## 2024-11-06 DIAGNOSIS — Z96.651 PRESENCE OF RIGHT ARTIFICIAL KNEE JOINT: ICD-10-CM

## 2024-11-06 DIAGNOSIS — M25.561 PAIN IN RIGHT KNEE: ICD-10-CM

## 2024-11-06 PROCEDURE — 99214 OFFICE O/P EST MOD 30 MIN: CPT

## 2024-11-13 ENCOUNTER — APPOINTMENT (OUTPATIENT)
Dept: PAIN MANAGEMENT | Facility: CLINIC | Age: 64
End: 2024-11-13

## 2024-11-13 VITALS
HEART RATE: 72 BPM | WEIGHT: 170 LBS | DIASTOLIC BLOOD PRESSURE: 86 MMHG | HEIGHT: 60 IN | SYSTOLIC BLOOD PRESSURE: 146 MMHG | BODY MASS INDEX: 33.38 KG/M2 | OXYGEN SATURATION: 97 %

## 2024-11-13 DIAGNOSIS — G57.71 CAUSALGIA OF RIGHT LOWER LIMB: ICD-10-CM

## 2024-11-13 DIAGNOSIS — T84.89XD OTHER SPECIFIED COMPLICATION OF INTERNAL ORTHOPEDIC PROSTHETIC DEVICES, IMPLANTS AND GRAFTS, SUBSEQUENT ENCOUNTER: ICD-10-CM

## 2024-11-13 DIAGNOSIS — M54.16 RADICULOPATHY, LUMBAR REGION: ICD-10-CM

## 2024-11-13 DIAGNOSIS — Z96.659 OTHER SPECIFIED COMPLICATION OF INTERNAL ORTHOPEDIC PROSTHETIC DEVICES, IMPLANTS AND GRAFTS, SUBSEQUENT ENCOUNTER: ICD-10-CM

## 2024-11-13 DIAGNOSIS — M25.669 OTHER SPECIFIED COMPLICATION OF INTERNAL ORTHOPEDIC PROSTHETIC DEVICES, IMPLANTS AND GRAFTS, SUBSEQUENT ENCOUNTER: ICD-10-CM

## 2024-11-13 PROCEDURE — 99215 OFFICE O/P EST HI 40 MIN: CPT

## 2024-12-18 ENCOUNTER — APPOINTMENT (OUTPATIENT)
Age: 64
End: 2024-12-18
Payer: MEDICARE

## 2024-12-18 VITALS
HEART RATE: 70 BPM | DIASTOLIC BLOOD PRESSURE: 81 MMHG | RESPIRATION RATE: 15 BRPM | WEIGHT: 175 LBS | TEMPERATURE: 97.5 F | SYSTOLIC BLOOD PRESSURE: 147 MMHG | OXYGEN SATURATION: 96 % | BODY MASS INDEX: 34.36 KG/M2 | HEIGHT: 60 IN

## 2024-12-18 DIAGNOSIS — N20.0 CALCULUS OF KIDNEY: ICD-10-CM

## 2024-12-18 DIAGNOSIS — R30.0 DYSURIA: ICD-10-CM

## 2024-12-18 PROCEDURE — G2211 COMPLEX E/M VISIT ADD ON: CPT

## 2024-12-18 PROCEDURE — 99214 OFFICE O/P EST MOD 30 MIN: CPT

## 2025-05-09 ENCOUNTER — APPOINTMENT (OUTPATIENT)
Dept: ORTHOPEDIC SURGERY | Facility: CLINIC | Age: 65
End: 2025-05-09
Payer: OTHER MISCELLANEOUS

## 2025-05-09 DIAGNOSIS — M54.16 RADICULOPATHY, LUMBAR REGION: ICD-10-CM

## 2025-05-09 PROCEDURE — 99214 OFFICE O/P EST MOD 30 MIN: CPT

## 2025-05-09 RX ORDER — METHYLPREDNISOLONE 4 MG/1
4 TABLET ORAL
Qty: 1 | Refills: 1 | Status: ACTIVE | COMMUNITY
Start: 2025-05-09 | End: 1900-01-01

## 2025-05-27 ENCOUNTER — APPOINTMENT (OUTPATIENT)
Dept: ORTHOPEDIC SURGERY | Facility: CLINIC | Age: 65
End: 2025-05-27
Payer: OTHER MISCELLANEOUS

## 2025-05-27 DIAGNOSIS — Z96.659 OTHER SPECIFIED COMPLICATION OF INTERNAL ORTHOPEDIC PROSTHETIC DEVICES, IMPLANTS AND GRAFTS, SUBSEQUENT ENCOUNTER: ICD-10-CM

## 2025-05-27 DIAGNOSIS — T84.89XD OTHER SPECIFIED COMPLICATION OF INTERNAL ORTHOPEDIC PROSTHETIC DEVICES, IMPLANTS AND GRAFTS, SUBSEQUENT ENCOUNTER: ICD-10-CM

## 2025-05-27 DIAGNOSIS — M25.669 OTHER SPECIFIED COMPLICATION OF INTERNAL ORTHOPEDIC PROSTHETIC DEVICES, IMPLANTS AND GRAFTS, SUBSEQUENT ENCOUNTER: ICD-10-CM

## 2025-05-27 PROCEDURE — 99214 OFFICE O/P EST MOD 30 MIN: CPT

## 2025-05-27 RX ORDER — METHYLPREDNISOLONE 4 MG/1
4 TABLET ORAL
Qty: 1 | Refills: 1 | Status: ACTIVE | COMMUNITY
Start: 2025-05-27 | End: 1900-01-01

## 2025-06-06 ENCOUNTER — NON-APPOINTMENT (OUTPATIENT)
Age: 65
End: 2025-06-06

## 2025-06-06 ENCOUNTER — APPOINTMENT (OUTPATIENT)
Dept: ORTHOPEDIC SURGERY | Facility: CLINIC | Age: 65
End: 2025-06-06
Payer: OTHER MISCELLANEOUS

## 2025-06-06 PROCEDURE — 99213 OFFICE O/P EST LOW 20 MIN: CPT

## 2025-06-06 RX ORDER — METHYLPREDNISOLONE 4 MG/1
4 TABLET ORAL
Qty: 1 | Refills: 1 | Status: ACTIVE | COMMUNITY
Start: 2025-06-06 | End: 1900-01-01

## 2025-06-17 ENCOUNTER — APPOINTMENT (OUTPATIENT)
Age: 65
End: 2025-06-17

## 2025-06-17 VITALS
BODY MASS INDEX: 34.36 KG/M2 | DIASTOLIC BLOOD PRESSURE: 84 MMHG | RESPIRATION RATE: 15 BRPM | OXYGEN SATURATION: 94 % | HEIGHT: 60 IN | SYSTOLIC BLOOD PRESSURE: 146 MMHG | WEIGHT: 175 LBS | HEART RATE: 97 BPM | TEMPERATURE: 97 F

## 2025-06-17 PROCEDURE — 99214 OFFICE O/P EST MOD 30 MIN: CPT

## 2025-06-17 PROCEDURE — G2211 COMPLEX E/M VISIT ADD ON: CPT

## 2025-06-18 LAB
APPEARANCE: CLEAR
BILIRUBIN URINE: NEGATIVE
BLOOD URINE: NEGATIVE
COLOR: YELLOW
GLUCOSE QUALITATIVE U: NEGATIVE MG/DL
KETONES URINE: NEGATIVE MG/DL
LEUKOCYTE ESTERASE URINE: NEGATIVE
NITRITE URINE: NEGATIVE
PH URINE: 6.5
PROTEIN URINE: NEGATIVE MG/DL
SPECIFIC GRAVITY URINE: <1.005
UROBILINOGEN URINE: 0.2 MG/DL

## 2025-06-29 LAB — BACTERIA UR CULT: ABNORMAL

## (undated) DEVICE — DRAPE LIGHT HANDLE COVER (GREEN)

## (undated) DEVICE — SAW BLADE S&N GIGLI 20"

## (undated) DEVICE — SUT VICRYL 3-0 18" PS-1 UNDYED

## (undated) DEVICE — Device

## (undated) DEVICE — SAW BLADE STRYKER SAGITTAL 25X86.5X1.32MM

## (undated) DEVICE — SAW BLADE STRYKER SAGITTAL 81.5X12.5X1.19MM

## (undated) DEVICE — HOOD FLYTE STRYKER HELMET SHIELD

## (undated) DEVICE — NDL HYPO SAFE 22G X 1.5" (BLACK)

## (undated) DEVICE — VENODYNE/SCD SLEEVE CALF MEDIUM

## (undated) DEVICE — DRSG ACE BANDAGE 6"

## (undated) DEVICE — SAW BLADE STRYKER SAGITTAL DUAL CUT TEETH 35X64X.64MM

## (undated) DEVICE — WARMING BLANKET UPPER ADULT

## (undated) DEVICE — SUT VICRYL 2-0 27" CT-1 UNDYED

## (undated) DEVICE — SUT VICRYL 1 27" OS-8 UNDYED

## (undated) DEVICE — DRSG WEBRIL 6"

## (undated) DEVICE — POSITIONER FOAM EGG CRATE ULNAR 2PCS (PINK)

## (undated) DEVICE — DRSG PICO NPWT 4X12"

## (undated) DEVICE — SAW BLADE STRYKER SAGITTAL NARROW

## (undated) DEVICE — DRSG AQUACEL 3.5 X 10"

## (undated) DEVICE — SUT STRATAFIX SPIRAL PDO 1 30CM OS-6

## (undated) DEVICE — SUT STRATAFIX SPIRAL MONOCRYL PLUS 3-0 30CM PS-1 UNDYED

## (undated) DEVICE — DRSG COBAN 6"

## (undated) DEVICE — DRAPE U POLY BLUE 60X72"

## (undated) DEVICE — PACK TOTAL KNEE